# Patient Record
Sex: MALE | Race: WHITE | NOT HISPANIC OR LATINO | Employment: FULL TIME | URBAN - METROPOLITAN AREA
[De-identification: names, ages, dates, MRNs, and addresses within clinical notes are randomized per-mention and may not be internally consistent; named-entity substitution may affect disease eponyms.]

---

## 2017-01-31 ENCOUNTER — ALLSCRIPTS OFFICE VISIT (OUTPATIENT)
Dept: OTHER | Facility: OTHER | Age: 57
End: 2017-01-31

## 2017-04-07 DIAGNOSIS — I10 ESSENTIAL (PRIMARY) HYPERTENSION: ICD-10-CM

## 2017-04-07 DIAGNOSIS — R53.83 OTHER FATIGUE: ICD-10-CM

## 2017-04-07 DIAGNOSIS — E55.9 VITAMIN D DEFICIENCY: ICD-10-CM

## 2017-04-07 DIAGNOSIS — R73.9 HYPERGLYCEMIA: ICD-10-CM

## 2017-04-07 DIAGNOSIS — E78.5 HYPERLIPIDEMIA: ICD-10-CM

## 2017-04-07 DIAGNOSIS — Z12.5 ENCOUNTER FOR SCREENING FOR MALIGNANT NEOPLASM OF PROSTATE: ICD-10-CM

## 2017-08-10 ENCOUNTER — ALLSCRIPTS OFFICE VISIT (OUTPATIENT)
Dept: OTHER | Facility: OTHER | Age: 57
End: 2017-08-10

## 2017-08-14 ENCOUNTER — ALLSCRIPTS OFFICE VISIT (OUTPATIENT)
Dept: OTHER | Facility: OTHER | Age: 57
End: 2017-08-14

## 2017-10-20 ENCOUNTER — ALLSCRIPTS OFFICE VISIT (OUTPATIENT)
Dept: OTHER | Facility: OTHER | Age: 57
End: 2017-10-20

## 2018-01-10 NOTE — PROGRESS NOTES
Chief Complaint  Pt presented for influenza vaccine; administered without incident and tolerated well  ksd,cma      Active Problems    1  Acute bronchitis, unspecified organism (466 0) (J20 9)   2  Benign prostatic hypertrophy (600 00) (N40 0)   3  Bipolar Disorder (296 00)   4  Cerumen impaction (380 4) (H61 20)   5  Depression (311) (F32 9)   6  Encounter for PPD test (V74 1) (Z11 1)   7  Fatigue (780 79) (R53 83)   8  Hearing loss (389 9) (H91 90)   9  Hyperglycemia (790 29) (R73 9)   10  Hyperlipidemia (272 4) (E78 5)   11  Hypertension (401 9) (I10)   12  Need for pneumococcal vaccination (V03 82) (Z23)   13  Need for prophylactic vaccination and inoculation against influenza (V04 81) (Z23)   14  Obesity (278 00) (E66 9)   15  Obstructive sleep apnea (327 23) (G47 33)   16  Screening PSA (prostate specific antigen) (V76 44) (Z12 5)   17  Systolic ejection murmur (785 2) (R01 1)   18  Vitamin B12 deficiency (266 2) (E53 8)   19  Vitamin D deficiency (268 9) (E55 9)    Current Meds   1  AmLODIPine Besylate 10 MG Oral Tablet; Take 1 tablet daily; Therapy: 97VWH2359 to (Evaluate:01Ick2707)  Requested for: 02Sep2017; Last   Rx:10Aug2017 Ordered   2  Losartan Potassium-HCTZ 100-12 5 MG Oral Tablet; Take 1 tablet by mouth  daily; Therapy: 40UBQ8699 to (Hermes Saha)  Requested for: 02Sep2017; Last   Rx:10Aug2017 Ordered   3  Venlafaxine HCl  MG Oral Capsule Extended Release 24 Hour; TAKE 1   CAPSULE EVERY DAY; Therapy: 96ROV9421 to ([id number])  Requested for: 77MRZ5096; Last   Rx:11Oct2017 Ordered   4  Vitamin D3 3000 UNIT Oral Tablet; Therapy: (Recorded:31Jan2017) to Recorded    Allergies    1  Compazine TABS   2  Lisinopril TABS   3   SEROquel TABS    Plan  Flu vaccine need    · Fluzone Quadrivalent 0 5 ML Intramuscular Suspension Prefilled Syringe    Future Appointments    Date/Time Provider Specialty Site   77/18/8385 10:92 PM Roma San, [address] [place] [address] Signatures   Electronically signed by : Kushal Cortes DO; Oct 20 1914  4:11PM EST                       (Author)

## 2018-01-12 VITALS
DIASTOLIC BLOOD PRESSURE: 100 MMHG | HEIGHT: 68 IN | WEIGHT: 210 LBS | BODY MASS INDEX: 31.83 KG/M2 | SYSTOLIC BLOOD PRESSURE: 160 MMHG

## 2018-01-13 VITALS
SYSTOLIC BLOOD PRESSURE: 130 MMHG | WEIGHT: 214.13 LBS | HEIGHT: 68 IN | BODY MASS INDEX: 32.45 KG/M2 | DIASTOLIC BLOOD PRESSURE: 80 MMHG

## 2018-01-15 NOTE — PROGRESS NOTES
Chief Complaint  pt here for PPD read - negative [facility]      Active Problems    1  Acute bronchitis, unspecified organism (466 0) (J20 9)   2  Benign prostatic hypertrophy (600 00) (N40 0)   3  Bipolar Disorder (296 00)   4  Cerumen impaction (380 4) (H61 20)   5  Depression (311) (F32 9)   6  Encounter for PPD test (V74 1) (Z11 1)   7  Fatigue (780 79) (R53 83)   8  Hearing loss (389 9) (H91 90)   9  Hyperglycemia (790 29) (R73 9)   10  Hyperlipidemia (272 4) (E78 5)   11  Hypertension (401 9) (I10)   12  Need for pneumococcal vaccination (V03 82) (Z23)   13  Need for prophylactic vaccination and inoculation against influenza (V04 81) (Z23)   14  Obesity (278 00) (E66 9)   15  Obstructive sleep apnea (327 23) (G47 33)   16  Screening PSA (prostate specific antigen) (V76 44) (Z12 5)   17  Systolic ejection murmur (785 2) (R01 1)   18  Vitamin B12 deficiency (266 2) (E53 8)   19  Vitamin D deficiency (268 9) (E55 9)    Current Meds   1  AmLODIPine Besylate 10 MG Oral Tablet; Take 1 tablet daily; Therapy: 85IOR1971 to (Evaluate:40Oex6056)  Requested for: 42Pso4032; Last   Rx:10Aug2017; Status: ACTIVE - Retrospective By Protocol Authorization Ordered   2  Losartan Potassium-HCTZ 100-12 5 MG Oral Tablet; Take 1 tablet by mouth  daily; Therapy: 09CZK7155 to (Jessi Gonzalez)  Requested for: 10Aug2017; Last   Rx:10Aug2017; Status: ACTIVE - Retrospective By Protocol Authorization Ordered   3  Venlafaxine HCl  MG Oral Capsule Extended Release 24 Hour; TAKE 1   CAPSULE DAILY; Therapy: 18FRB1431 to (Evaluate:28Mqn3873)  Requested for: 87GSE0302; Last   Rx:01Dtl0586 Ordered   4  Vitamin D3 3000 UNIT Oral Tablet; Therapy: (Recorded:31Jan2017) to Recorded    Allergies    1  Compazine TABS   2  Lisinopril TABS   3  SEROquel TABS    Plan  Encounter for PPD test    · PPD  Hypertension    · AmLODIPine Besylate 10 MG Oral Tablet (Norvasc); Take 1 tablet daily   · Losartan Potassium-HCTZ 100-12 5 MG Oral Tablet;  Take 1 tablet by mouth   daily    Future Appointments    Date/Time Provider Specialty Site   01/25/3106 34:72 PM Chantel Ferrell DO Family [facility]     Signatures   Electronically signed by : Yoshi Rodriguez DO; Sep  2 1145  4:04PM EST                       (Author)

## 2018-03-23 DIAGNOSIS — F32.A DEPRESSION, UNSPECIFIED DEPRESSION TYPE: Primary | ICD-10-CM

## 2018-03-26 RX ORDER — VENLAFAXINE HYDROCHLORIDE 150 MG/1
CAPSULE, EXTENDED RELEASE ORAL
Qty: 90 CAPSULE | Refills: 0 | Status: SHIPPED | OUTPATIENT
Start: 2018-03-26 | End: 2018-06-25 | Stop reason: SDUPTHER

## 2018-04-18 ENCOUNTER — OFFICE VISIT (OUTPATIENT)
Dept: FAMILY MEDICINE CLINIC | Facility: CLINIC | Age: 58
End: 2018-04-18
Payer: COMMERCIAL

## 2018-04-18 VITALS
HEIGHT: 68 IN | DIASTOLIC BLOOD PRESSURE: 82 MMHG | SYSTOLIC BLOOD PRESSURE: 128 MMHG | WEIGHT: 210.2 LBS | BODY MASS INDEX: 31.86 KG/M2

## 2018-04-18 DIAGNOSIS — E55.9 VITAMIN D DEFICIENCY: ICD-10-CM

## 2018-04-18 DIAGNOSIS — I10 HYPERTENSION, UNSPECIFIED TYPE: Primary | ICD-10-CM

## 2018-04-18 DIAGNOSIS — R73.03 PREDIABETES: ICD-10-CM

## 2018-04-18 DIAGNOSIS — Z12.5 PROSTATE CANCER SCREENING: ICD-10-CM

## 2018-04-18 DIAGNOSIS — I10 ESSENTIAL HYPERTENSION: Primary | ICD-10-CM

## 2018-04-18 DIAGNOSIS — E78.2 MIXED HYPERLIPIDEMIA: ICD-10-CM

## 2018-04-18 PROBLEM — R01.1 SYSTOLIC EJECTION MURMUR: Status: ACTIVE | Noted: 2017-08-11

## 2018-04-18 PROCEDURE — 3008F BODY MASS INDEX DOCD: CPT | Performed by: FAMILY MEDICINE

## 2018-04-18 PROCEDURE — 99214 OFFICE O/P EST MOD 30 MIN: CPT | Performed by: FAMILY MEDICINE

## 2018-04-18 PROCEDURE — 3074F SYST BP LT 130 MM HG: CPT | Performed by: FAMILY MEDICINE

## 2018-04-18 PROCEDURE — 3079F DIAST BP 80-89 MM HG: CPT | Performed by: FAMILY MEDICINE

## 2018-04-18 NOTE — PROGRESS NOTES
Assessment/Plan:    Hypertension  Blood pressure stable-continue present medication recheck in 6 months  Hyperlipidemia  Labs ordered-await result  Today the patient is here with his wife  We did review his previous labs to given of the recall so that when is no results come in we can compare  Prediabetes  Labs ordered await results    Vitamin D deficiency  Labs ordered  Await result       Diagnoses and all orders for this visit:    Essential hypertension  -     Comprehensive metabolic panel; Future  -     TSH, 3rd generation; Future    Vitamin D deficiency  -     Vitamin D 25 hydroxy; Future    Prediabetes  -     HEMOGLOBIN A1C W/ EAG ESTIMATION; Future    Mixed hyperlipidemia  -     Lipid panel; Future    Prostate cancer screening  -     PSA, Total Screen; Future        There are no Patient Instructions on file for this visit  Subjective:        Patient ID: Yamila Vega is a 63 y o  male  Chief Complaint   Patient presents with    Follow-up     HTN and cholesterol; discuss bump upper abdomen       Patient here for 6 month check regarding blood pressure  Not have labs done prior to today's visit  Overall feels well        The following portions of the patient's history were reviewed and updated as appropriate: past medical history, past surgical history and problem list       Review of Systems   Constitutional: Negative for appetite change, fatigue, fever and unexpected weight change  HENT: Negative for congestion, ear pain, postnasal drip, rhinorrhea, sinus pain, sinus pressure and sore throat  Eyes: Negative for redness and visual disturbance  Respiratory: Negative for chest tightness and shortness of breath  Cardiovascular: Negative for chest pain, palpitations and leg swelling  Gastrointestinal: Negative for abdominal distention, abdominal pain, diarrhea and nausea  Endocrine: Negative for cold intolerance and heat intolerance  Genitourinary: Negative for dysuria and hematuria  Musculoskeletal: Negative for arthralgias, gait problem and myalgias  Skin: Negative for pallor and rash  Neurological: Negative for dizziness and headaches  Psychiatric/Behavioral: Negative for behavioral problems  The patient is not nervous/anxious  Objective:  /82 (BP Location: Left arm, Patient Position: Sitting, Cuff Size: Standard)   Ht 5' 8" (1 727 m)   Wt 95 3 kg (210 lb 3 2 oz)   BMI 31 96 kg/m²        Physical Exam   Constitutional: He is oriented to person, place, and time  He appears well-developed and well-nourished  No distress  Obese   HENT:   Head: Normocephalic and atraumatic  Right Ear: External ear normal    Left Ear: External ear normal    Mouth/Throat: Oropharynx is clear and moist    Eyes: Conjunctivae and EOM are normal  Pupils are equal, round, and reactive to light  No scleral icterus  Neck: Normal range of motion  Neck supple  No thyromegaly present  Cardiovascular: Normal rate, regular rhythm and intact distal pulses  No murmur heard  Pulmonary/Chest: Effort normal and breath sounds normal  He has no wheezes  Abdominal: Soft  Bowel sounds are normal  He exhibits no distension  There is no tenderness  Musculoskeletal: Normal range of motion  He exhibits no edema  Lymphadenopathy:     He has no cervical adenopathy  Neurological: He is alert and oriented to person, place, and time  He displays normal reflexes  Coordination normal    Skin: Skin is warm  No pallor  Psychiatric: He has a normal mood and affect   His behavior is normal  Thought content normal

## 2018-04-18 NOTE — ASSESSMENT & PLAN NOTE
Labs ordered-await result  Today the patient is here with his wife  We did review his previous labs to given of the recall so that when is no results come in we can compare

## 2018-04-19 RX ORDER — LOSARTAN POTASSIUM AND HYDROCHLOROTHIAZIDE 12.5; 1 MG/1; MG/1
1 TABLET ORAL DAILY
Qty: 90 TABLET | Refills: 3 | Status: SHIPPED | OUTPATIENT
Start: 2018-04-19 | End: 2019-05-23 | Stop reason: SDUPTHER

## 2018-04-19 RX ORDER — AMLODIPINE BESYLATE 10 MG/1
10 TABLET ORAL DAILY
Qty: 90 TABLET | Refills: 3 | Status: SHIPPED | OUTPATIENT
Start: 2018-04-19 | End: 2021-09-23 | Stop reason: HOSPADM

## 2018-06-25 DIAGNOSIS — F32.A DEPRESSION, UNSPECIFIED DEPRESSION TYPE: ICD-10-CM

## 2018-06-26 RX ORDER — VENLAFAXINE HYDROCHLORIDE 150 MG/1
150 CAPSULE, EXTENDED RELEASE ORAL DAILY
Qty: 90 CAPSULE | Refills: 1 | Status: SHIPPED | OUTPATIENT
Start: 2018-06-26 | End: 2018-12-19 | Stop reason: SDUPTHER

## 2018-10-24 ENCOUNTER — OFFICE VISIT (OUTPATIENT)
Dept: FAMILY MEDICINE CLINIC | Facility: CLINIC | Age: 58
End: 2018-10-24
Payer: COMMERCIAL

## 2018-10-24 VITALS
BODY MASS INDEX: 30.87 KG/M2 | SYSTOLIC BLOOD PRESSURE: 140 MMHG | WEIGHT: 208.4 LBS | DIASTOLIC BLOOD PRESSURE: 90 MMHG | HEIGHT: 69 IN

## 2018-10-24 DIAGNOSIS — R73.03 PREDIABETES: ICD-10-CM

## 2018-10-24 DIAGNOSIS — J01.90 ACUTE NON-RECURRENT SINUSITIS, UNSPECIFIED LOCATION: ICD-10-CM

## 2018-10-24 DIAGNOSIS — I10 ESSENTIAL HYPERTENSION: Primary | ICD-10-CM

## 2018-10-24 DIAGNOSIS — E78.5 HYPERLIPIDEMIA, UNSPECIFIED HYPERLIPIDEMIA TYPE: ICD-10-CM

## 2018-10-24 DIAGNOSIS — Z23 NEED FOR INFLUENZA VACCINATION: ICD-10-CM

## 2018-10-24 PROCEDURE — 99214 OFFICE O/P EST MOD 30 MIN: CPT | Performed by: FAMILY MEDICINE

## 2018-10-24 PROCEDURE — 90682 RIV4 VACC RECOMBINANT DNA IM: CPT

## 2018-10-24 PROCEDURE — 3008F BODY MASS INDEX DOCD: CPT | Performed by: FAMILY MEDICINE

## 2018-10-24 PROCEDURE — 1036F TOBACCO NON-USER: CPT | Performed by: FAMILY MEDICINE

## 2018-10-24 PROCEDURE — 90471 IMMUNIZATION ADMIN: CPT

## 2018-10-24 RX ORDER — AZITHROMYCIN 500 MG/1
500 TABLET, FILM COATED ORAL DAILY
Qty: 3 TABLET | Refills: 0 | Status: SHIPPED | OUTPATIENT
Start: 2018-10-24 | End: 2018-10-27

## 2018-10-24 NOTE — PROGRESS NOTES
Assessment/Plan:  Patient's blood pressure is up but he has been out of his blood pressure medicine for week  Recommend to continue taking his medication the right way and checking home blood pressures  Patient overdue for labs regarding his prediabetes and hyperlipidemia  Will get this done hopefully within the next week  Patient given prescription for Zithromax for 3 days for possible sinusitis  Flu shot given today  Recheck otherwise in 6 months for blood pressure check  Also refer to [address] [place] [address] Gastroenterology for baseline colonoscopy at the [address] [place] [address] and all orders for this visit:    Essential hypertension    Prediabetes    Hyperlipidemia, unspecified hyperlipidemia type    Acute non-recurrent sinusitis, unspecified location  -     azithromycin (ZITHROMAX) 500 MG tablet; Take 1 tablet (500 mg total) by mouth daily for 3 days    Need for influenza vaccination  -     influenza vaccine, [id number], quadrivalent, recombinant, PF, 0 5 mL, for patients 18 yr+ (FLUBLOK)        1  Essential hypertension     2  Prediabetes     3  Hyperlipidemia, unspecified hyperlipidemia type     4  Acute non-recurrent sinusitis, unspecified location  azithromycin (ZITHROMAX) 500 MG tablet   5  Need for influenza vaccination  influenza vaccine, [id number], quadrivalent, recombinant, PF, 0 5 mL, for patients 18 yr+ (FLUBLOK)       Subjective:        Patient ID: Meera Mary is a 63 y o  male  Chief Complaint   Patient presents with    Follow-up     6 months; discuss t-dap  pt states that a couple of day ago it felt like he had a sinus infection and after he was getting better the L side of the face started to get pain in the jaw    Flu Vaccine    Colonoscopy     discuss colonoscopy       Patient for 6 month check regarding blood pressures and labs  Unfortunately he once again forgot to do his blood work  Having sinus like issues especially with left-sided jaw pain    Symptoms have been present for about a week or so        The following portions of the patient's history were reviewed and updated as appropriate: past medical history, past surgical history and problem list       Review of Systems   Constitutional: Negative for appetite change, fatigue, fever and unexpected weight change  HENT: Positive for congestion, sinus pain and sinus pressure  Negative for ear pain, postnasal drip, rhinorrhea and sore throat  Eyes: Negative for redness and visual disturbance  Respiratory: Negative for chest tightness and shortness of breath  Cardiovascular: Negative for chest pain, palpitations and leg swelling  Gastrointestinal: Negative for abdominal distention, abdominal pain, diarrhea and nausea  Endocrine: Negative for cold intolerance and heat intolerance  Genitourinary: Negative for dysuria and hematuria  Musculoskeletal: Negative for arthralgias, gait problem and myalgias  Skin: Negative for pallor and rash  Neurological: Negative for dizziness and headaches  Psychiatric/Behavioral: Negative for behavioral problems  The patient is not nervous/anxious  Objective:  /90 (BP Location: Left arm, Patient Position: Sitting, Cuff Size: Standard)   Ht 5' 9" (1 753 m)   Wt 94 5 kg (208 lb 6 4 oz)   BMI 30 78 kg/m²        Physical Exam   Constitutional: He is oriented to person, place, and time  He appears well-nourished  No distress  HENT:   Head: Normocephalic and atraumatic  Right Ear: Tympanic membrane and external ear normal    Left Ear: Tympanic membrane and external ear normal    Nose: Nose normal    Mouth/Throat: Oropharynx is clear and moist    Eyes: Pupils are equal, round, and reactive to light  Conjunctivae and EOM are normal  No scleral icterus  Neck: Normal range of motion  Neck supple  No thyromegaly present  Cardiovascular: Normal rate, regular rhythm and intact distal pulses  No murmur heard    Pulmonary/Chest: Effort normal and breath sounds normal  He has no wheezes  Abdominal: Soft  He exhibits no distension  There is no tenderness  Musculoskeletal: Normal range of motion  He exhibits no edema  Lymphadenopathy:     He has no cervical adenopathy  Neurological: He is alert and oriented to person, place, and time  No cranial nerve deficit  Coordination normal    Skin: Skin is warm  No pallor  Psychiatric: He has a normal mood and affect  His behavior is normal  Thought content normal    Nursing note and vitals reviewed

## 2018-12-19 DIAGNOSIS — F32.A DEPRESSION, UNSPECIFIED DEPRESSION TYPE: ICD-10-CM

## 2018-12-20 RX ORDER — VENLAFAXINE HYDROCHLORIDE 150 MG/1
150 CAPSULE, EXTENDED RELEASE ORAL DAILY
Qty: 90 CAPSULE | Refills: 0 | Status: SHIPPED | OUTPATIENT
Start: 2018-12-20 | End: 2019-03-21 | Stop reason: SDUPTHER

## 2019-03-21 DIAGNOSIS — F32.A DEPRESSION, UNSPECIFIED DEPRESSION TYPE: ICD-10-CM

## 2019-03-21 RX ORDER — VENLAFAXINE HYDROCHLORIDE 150 MG/1
150 CAPSULE, EXTENDED RELEASE ORAL DAILY
Qty: 90 CAPSULE | Refills: 0 | Status: SHIPPED | OUTPATIENT
Start: 2019-03-21 | End: 2019-06-17 | Stop reason: SDUPTHER

## 2019-05-23 DIAGNOSIS — I10 HYPERTENSION, UNSPECIFIED TYPE: ICD-10-CM

## 2019-05-23 RX ORDER — LOSARTAN POTASSIUM AND HYDROCHLOROTHIAZIDE 12.5; 1 MG/1; MG/1
1 TABLET ORAL DAILY
Qty: 90 TABLET | Refills: 3 | Status: SHIPPED | OUTPATIENT
Start: 2019-05-23 | End: 2019-06-25

## 2019-06-17 DIAGNOSIS — F32.A DEPRESSION, UNSPECIFIED DEPRESSION TYPE: ICD-10-CM

## 2019-06-17 RX ORDER — VENLAFAXINE HYDROCHLORIDE 150 MG/1
CAPSULE, EXTENDED RELEASE ORAL
Qty: 90 CAPSULE | Refills: 0 | Status: SHIPPED | OUTPATIENT
Start: 2019-06-17 | End: 2019-09-12 | Stop reason: SDUPTHER

## 2019-06-17 RX ORDER — VENLAFAXINE HYDROCHLORIDE 150 MG/1
150 CAPSULE, EXTENDED RELEASE ORAL DAILY
Qty: 90 CAPSULE | Refills: 0 | OUTPATIENT
Start: 2019-06-17

## 2019-06-25 DIAGNOSIS — I10 ESSENTIAL HYPERTENSION: Primary | ICD-10-CM

## 2019-06-25 RX ORDER — HYDROCHLOROTHIAZIDE 12.5 MG/1
12.5 TABLET ORAL DAILY
Qty: 90 TABLET | Refills: 0 | Status: SHIPPED | OUTPATIENT
Start: 2019-06-25 | End: 2019-09-21 | Stop reason: SDUPTHER

## 2019-06-25 RX ORDER — LOSARTAN POTASSIUM 100 MG/1
100 TABLET ORAL DAILY
Qty: 90 TABLET | Refills: 0 | Status: SHIPPED | OUTPATIENT
Start: 2019-06-25 | End: 2019-11-09 | Stop reason: SDUPTHER

## 2019-09-12 DIAGNOSIS — F32.A DEPRESSION, UNSPECIFIED DEPRESSION TYPE: ICD-10-CM

## 2019-09-12 RX ORDER — VENLAFAXINE HYDROCHLORIDE 150 MG/1
CAPSULE, EXTENDED RELEASE ORAL
Qty: 90 CAPSULE | Refills: 0 | Status: SHIPPED | OUTPATIENT
Start: 2019-09-12 | End: 2019-12-09 | Stop reason: SDUPTHER

## 2019-09-21 DIAGNOSIS — I10 ESSENTIAL HYPERTENSION: ICD-10-CM

## 2019-09-24 ENCOUNTER — OFFICE VISIT (OUTPATIENT)
Dept: FAMILY MEDICINE CLINIC | Facility: CLINIC | Age: 59
End: 2019-09-24
Payer: COMMERCIAL

## 2019-09-24 VITALS
HEIGHT: 69 IN | SYSTOLIC BLOOD PRESSURE: 140 MMHG | OXYGEN SATURATION: 96 % | HEART RATE: 73 BPM | WEIGHT: 211.4 LBS | DIASTOLIC BLOOD PRESSURE: 80 MMHG | BODY MASS INDEX: 31.31 KG/M2 | TEMPERATURE: 98.1 F

## 2019-09-24 DIAGNOSIS — E55.9 VITAMIN D DEFICIENCY: ICD-10-CM

## 2019-09-24 DIAGNOSIS — R73.03 PREDIABETES: ICD-10-CM

## 2019-09-24 DIAGNOSIS — J01.00 ACUTE NON-RECURRENT MAXILLARY SINUSITIS: Primary | ICD-10-CM

## 2019-09-24 DIAGNOSIS — Z23 ENCOUNTER FOR IMMUNIZATION: ICD-10-CM

## 2019-09-24 DIAGNOSIS — E78.5 HYPERLIPIDEMIA, UNSPECIFIED HYPERLIPIDEMIA TYPE: ICD-10-CM

## 2019-09-24 DIAGNOSIS — Z12.5 PROSTATE CANCER SCREENING: ICD-10-CM

## 2019-09-24 DIAGNOSIS — I10 ESSENTIAL HYPERTENSION: ICD-10-CM

## 2019-09-24 PROCEDURE — 99213 OFFICE O/P EST LOW 20 MIN: CPT | Performed by: FAMILY MEDICINE

## 2019-09-24 PROCEDURE — 90471 IMMUNIZATION ADMIN: CPT | Performed by: FAMILY MEDICINE

## 2019-09-24 PROCEDURE — 3008F BODY MASS INDEX DOCD: CPT | Performed by: FAMILY MEDICINE

## 2019-09-24 PROCEDURE — 90682 RIV4 VACC RECOMBINANT DNA IM: CPT | Performed by: FAMILY MEDICINE

## 2019-09-24 RX ORDER — HYDROCHLOROTHIAZIDE 12.5 MG/1
TABLET ORAL
Qty: 90 TABLET | Refills: 0 | Status: SHIPPED | OUTPATIENT
Start: 2019-09-24 | End: 2019-12-09 | Stop reason: SDUPTHER

## 2019-09-24 RX ORDER — AZITHROMYCIN 250 MG/1
TABLET, FILM COATED ORAL
Qty: 6 TABLET | Refills: 0 | Status: SHIPPED | OUTPATIENT
Start: 2019-09-24 | End: 2019-09-29

## 2019-09-30 NOTE — PROGRESS NOTES
Assessment/Plan:    Treat his sinusitis with 5 day Z-Rakesh steam and ocean nasal spray  Blood pressure fair  Patient overdue for routine care and routine labs  Labs including CMP lipid and PSA with A1c to be done and patient will reschedule routine follow-up ASAP  Flu shot given today     Diagnoses and all orders for this visit:    Acute non-recurrent maxillary sinusitis  -     azithromycin (ZITHROMAX) 250 mg tablet; Take 2 tabs Day 1 then 1 tab daily Days 2 thru 5    Essential hypertension    Hyperlipidemia, unspecified hyperlipidemia type  -     Comprehensive metabolic panel; Future  -     Lipid panel; Future    Prostate cancer screening  -     PSA, Total Screen; Future    Prediabetes  -     Hemoglobin A1C; Future    Encounter for immunization  -     influenza vaccine, [id number], quadrivalent, recombinant, PF, 0 5 mL, for patients 18 yr+ (FLUBLOK)    Vitamin D deficiency  -     Vitamin D 25 hydroxy; Future        1  Acute non-recurrent maxillary sinusitis  azithromycin (ZITHROMAX) 250 mg tablet   2  Essential hypertension     3  Hyperlipidemia, unspecified hyperlipidemia type  Comprehensive metabolic panel    Lipid panel   4  Prostate cancer screening  PSA, Total Screen   5  Prediabetes  Hemoglobin A1C   6  Encounter for immunization  influenza vaccine, [id number], quadrivalent, recombinant, PF, 0 5 mL, for patients 18 yr+ (FLUBLOK)   7  Vitamin D deficiency  Vitamin D 25 hydroxy       Subjective:        Patient ID: Gilda Ledbetter is a 63 y o  male  Chief Complaint   Patient presents with    URI     congestion, cough; sx since Sat  keeping him up at night, coughing up green mucus, no fever, runny nose         Patient with sinus like symptoms over the last 7 days  Positive congestion and pressure  The following portions of the patient's history were reviewed and updated as appropriate: past medical history, past surgical history and problem list       Review of Systems   Constitutional: Negative for fever  HENT: Positive for congestion and sinus pressure  Respiratory: Positive for cough  Negative for wheezing  Cardiovascular: Negative for chest pain  Neurological: Negative for headaches  Objective:  /80 (BP Location: Left arm, Patient Position: Sitting, Cuff Size: Standard)   Pulse 73   Temp 98 1 °F (36 7 °C)   Ht 5' 9" (1 753 m)   Wt 95 9 kg (211 lb 6 4 oz)   SpO2 96%   BMI 31 22 kg/m²        Physical Exam   HENT:   Right Ear: Tympanic membrane and external ear normal    Left Ear: Tympanic membrane and external ear normal    Nose: Mucosal edema present  Pulmonary/Chest: Breath sounds normal    Musculoskeletal: He exhibits no edema  Lymphadenopathy:     He has no cervical adenopathy  Nursing note and vitals reviewed

## 2019-11-09 DIAGNOSIS — I10 ESSENTIAL HYPERTENSION: ICD-10-CM

## 2019-11-11 RX ORDER — LOSARTAN POTASSIUM 100 MG/1
TABLET ORAL
Qty: 90 TABLET | Refills: 0 | Status: SHIPPED | OUTPATIENT
Start: 2019-11-11 | End: 2019-12-09 | Stop reason: SDUPTHER

## 2019-12-09 ENCOUNTER — OFFICE VISIT (OUTPATIENT)
Dept: FAMILY MEDICINE CLINIC | Facility: CLINIC | Age: 59
End: 2019-12-09
Payer: COMMERCIAL

## 2019-12-09 VITALS
BODY MASS INDEX: 32.2 KG/M2 | SYSTOLIC BLOOD PRESSURE: 130 MMHG | WEIGHT: 217.4 LBS | TEMPERATURE: 97.5 F | HEART RATE: 74 BPM | OXYGEN SATURATION: 98 % | DIASTOLIC BLOOD PRESSURE: 80 MMHG | HEIGHT: 69 IN

## 2019-12-09 DIAGNOSIS — I10 ESSENTIAL HYPERTENSION: Primary | ICD-10-CM

## 2019-12-09 DIAGNOSIS — F32.A DEPRESSION, UNSPECIFIED DEPRESSION TYPE: ICD-10-CM

## 2019-12-09 DIAGNOSIS — I10 ESSENTIAL HYPERTENSION: ICD-10-CM

## 2019-12-09 DIAGNOSIS — R73.03 PREDIABETES: ICD-10-CM

## 2019-12-09 DIAGNOSIS — Z11.59 NEED FOR HEPATITIS C SCREENING TEST: ICD-10-CM

## 2019-12-09 PROCEDURE — 3008F BODY MASS INDEX DOCD: CPT | Performed by: FAMILY MEDICINE

## 2019-12-09 PROCEDURE — 99213 OFFICE O/P EST LOW 20 MIN: CPT | Performed by: FAMILY MEDICINE

## 2019-12-09 PROCEDURE — 1036F TOBACCO NON-USER: CPT | Performed by: FAMILY MEDICINE

## 2019-12-09 PROCEDURE — 3075F SYST BP GE 130 - 139MM HG: CPT | Performed by: FAMILY MEDICINE

## 2019-12-09 PROCEDURE — 3079F DIAST BP 80-89 MM HG: CPT | Performed by: FAMILY MEDICINE

## 2019-12-09 RX ORDER — VENLAFAXINE HYDROCHLORIDE 150 MG/1
CAPSULE, EXTENDED RELEASE ORAL
Qty: 90 CAPSULE | Refills: 0 | Status: SHIPPED | OUTPATIENT
Start: 2019-12-09 | End: 2019-12-09 | Stop reason: SDUPTHER

## 2019-12-09 NOTE — PATIENT INSTRUCTIONS
Obesity   AMBULATORY CARE:   Obesity  is when your body mass index (BMI) is greater than 30  Your healthcare provider will use your height and weight to measure your BMI  The risks of obesity include  many health problems, such as injuries or physical disability  You may need tests to check for the following:  · Diabetes     · High blood pressure or high cholesterol     · Heart disease     · Gallbladder or liver disease     · Cancer of the colon, breast, prostate, liver, or kidney     · Sleep apnea     · Arthritis or gout  Seek care immediately if:   · You have a severe headache, confusion, or difficulty speaking  · You have weakness on one side of your body  · You have chest pain, sweating, or shortness of breath  Contact your healthcare provider if:   · You have symptoms of gallbladder or liver disease, such as pain in your upper abdomen  · You have knee or hip pain and discomfort while walking  · You have symptoms of diabetes, such as intense hunger and thirst, and frequent urination  · You have symptoms of sleep apnea, such as snoring or daytime sleepiness  · You have questions or concerns about your condition or care  Treatment for obesity  focuses on helping you lose weight to improve your health  Even a small decrease in BMI can reduce the risk for many health problems  Your healthcare provider will help you set a weight-loss goal   · Lifestyle changes  are the first step in treating obesity  These include making healthy food choices and getting regular physical activity  Your healthcare provider may suggest a weight-loss program that involves coaching, education, and therapy  · Medicine  may help you lose weight when it is used with a healthy diet and physical activity  · Surgery  can help you lose weight if you are very obese and have other health problems  There are several types of weight-loss surgery  Ask your healthcare provider for more information    Be successful losing weight:   · Set small, realistic goals  An example of a small goal is to walk for 20 minutes 5 days a week  Anther goal is to lose 5% of your body weight  · Tell friends, family members, and coworkers about your goals  and ask for their support  Ask a friend to lose weight with you, or join a weight-loss support group  · Identify foods or triggers that may cause you to overeat , and find ways to avoid them  Remove tempting high-calorie foods from your home and workplace  Place a bowl of fresh fruit on your kitchen counter  If stress causes you to eat, then find other ways to cope with stress  · Keep a diary to track what you eat and drink  Also write down how many minutes of physical activity you do each day  Weigh yourself once a week and record it in your diary  Eating changes: You will need to eat 500 to 1,000 fewer calories each day than you currently eat to lose 1 to 2 pounds a week  The following changes will help you cut calories:  · Eat smaller portions  Use small plates, no larger than 9 inches in diameter  Fill your plate half full of fruits and vegetables  Measure your food using measuring cups until you know what a serving size looks like  · Eat 3 meals and 1 or 2 snacks each day  Plan your meals in advance  Manisha Abreu and eat at home most of the time  Eat slowly  · Eat fruits and vegetables at every meal   They are low in calories and high in fiber, which makes you feel full  Do not add butter, margarine, or cream sauce to vegetables  Use herbs to season steamed vegetables  · Eat less fat and fewer fried foods  Eat more baked or grilled chicken and fish  These protein sources are lower in calories and fat than red meat  Limit fast food  Dress your salads with olive oil and vinegar instead of bottled dressing  · Limit the amount of sugar you eat  Do not drink sugary beverages  Limit alcohol  Activity changes:  Physical activity is good for your body in many ways   It helps you burn calories and build strong muscles  It decreases stress and depression, and improves your mood  It can also help you sleep better  Talk to your healthcare provider before you begin an exercise program   · Exercise for at least 30 minutes 5 days a week  Start slowly  Set aside time each day for physical activity that you enjoy and that is convenient for you  It is best to do both weight training and an activity that increases your heart rate, such as walking, bicycling, or swimming  · Find ways to be more active  Do yard work and housecleaning  Walk up the stairs instead of using elevators  Spend your leisure time going to events that require walking, such as outdoor festivals or fairs  This extra physical activity can help you lose weight and keep it off  Follow up with your healthcare provider as directed: You may need to meet with a dietitian  Write down your questions so you remember to ask them during your visits  © 2017 [facility] Information is for End User's use only and may not be sold, redistributed or otherwise used for commercial purposes  All illustrations and images included in CareNotes® are the copyrighted property of [employer]  or [employer]  The above information is an  only  It is not intended as medical advice for individual conditions or treatments  Talk to your doctor, nurse or pharmacist before following any medical regimen to see if it is safe and effective for you

## 2019-12-09 NOTE — PROGRESS NOTES
Assessment/Plan:  Blood pressure stable  Patient overdue for getting lab work and colonoscopy  Insure kit given today  Recheck q 6 months  Diagnoses and all orders for this visit:    Essential hypertension    Depression, unspecified depression type    Prediabetes    Need for hepatitis C screening test  -     Hepatitis C antibody; Future        1  Essential hypertension     2  Depression, unspecified depression type     3  Prediabetes     4  Need for hepatitis C screening test  Hepatitis C antibody       Subjective:        Patient ID: Henry Corona is a 63 y o  male  Chief Complaint   Patient presents with    Follow-up     3 months;    Immunizations     discuss pneumo vaccine       Patient here for overdue visit  Has not done blood work previously ordered  Overall feels well      The following portions of the patient's history were reviewed and updated as appropriate: past medical history, past surgical history and problem list       Review of Systems   Constitutional: Negative for fatigue  Eyes: Negative for visual disturbance  Respiratory: Negative for cough and shortness of breath  Cardiovascular: Negative for chest pain, palpitations and leg swelling  Gastrointestinal: Negative for abdominal pain  Neurological: Negative for dizziness and headaches  Psychiatric/Behavioral: The patient is not nervous/anxious  Objective:  /80 (BP Location: Left arm, Patient Position: Sitting, Cuff Size: Standard)   Pulse 74   Temp 97 5 °F (36 4 °C)   Ht 5' 9" (1 753 m)   Wt 98 6 kg (217 lb 6 4 oz)   SpO2 98%   BMI 32 10 kg/m²        Physical Exam   Constitutional: He is oriented to person, place, and time  He appears well-nourished  No distress  HENT:   Head: Normocephalic  Eyes: Pupils are equal, round, and reactive to light  No scleral icterus  Cardiovascular:   Murmur heard  Pulmonary/Chest: Breath sounds normal    Musculoskeletal: He exhibits no edema     Lymphadenopathy:     He has no cervical adenopathy  Neurological: He is alert and oriented to person, place, and time  Psychiatric: He has a normal mood and affect  His behavior is normal  Thought content normal    Nursing note and vitals reviewed  BMI Counseling: Body mass index is 32 1 kg/m²  The BMI is above normal  Nutrition recommendations include reducing portion sizes, decreasing overall calorie intake, 3-5 servings of fruits/vegetables daily, reducing fast food intake, consuming healthier snacks, decreasing soda and/or juice intake and moderation in carbohydrate intake  Exercise recommendations include exercising 3-5 times per week

## 2019-12-10 RX ORDER — LOSARTAN POTASSIUM 100 MG/1
100 TABLET ORAL DAILY
Qty: 90 TABLET | Refills: 0 | Status: SHIPPED | OUTPATIENT
Start: 2019-12-10 | End: 2020-04-05

## 2019-12-10 RX ORDER — VENLAFAXINE HYDROCHLORIDE 150 MG/1
150 CAPSULE, EXTENDED RELEASE ORAL DAILY
Qty: 90 CAPSULE | Refills: 0 | Status: SHIPPED | OUTPATIENT
Start: 2019-12-10 | End: 2020-03-13

## 2019-12-10 RX ORDER — HYDROCHLOROTHIAZIDE 12.5 MG/1
12.5 TABLET ORAL DAILY
Qty: 90 TABLET | Refills: 0 | Status: SHIPPED | OUTPATIENT
Start: 2019-12-10 | End: 2020-11-13

## 2020-03-13 DIAGNOSIS — F32.A DEPRESSION, UNSPECIFIED DEPRESSION TYPE: ICD-10-CM

## 2020-03-13 RX ORDER — VENLAFAXINE HYDROCHLORIDE 150 MG/1
CAPSULE, EXTENDED RELEASE ORAL
Qty: 90 CAPSULE | Refills: 0 | Status: SHIPPED | OUTPATIENT
Start: 2020-03-13 | End: 2020-06-09 | Stop reason: SDUPTHER

## 2020-04-04 DIAGNOSIS — I10 ESSENTIAL HYPERTENSION: ICD-10-CM

## 2020-04-05 RX ORDER — LOSARTAN POTASSIUM 100 MG/1
TABLET ORAL
Qty: 90 TABLET | Refills: 0 | Status: SHIPPED | OUTPATIENT
Start: 2020-04-05 | End: 2020-11-13

## 2020-06-09 DIAGNOSIS — F32.A DEPRESSION, UNSPECIFIED DEPRESSION TYPE: ICD-10-CM

## 2020-06-09 RX ORDER — VENLAFAXINE HYDROCHLORIDE 150 MG/1
150 CAPSULE, EXTENDED RELEASE ORAL DAILY
Qty: 90 CAPSULE | Refills: 1 | Status: SHIPPED | OUTPATIENT
Start: 2020-06-09 | End: 2020-09-03

## 2020-09-02 DIAGNOSIS — F32.A DEPRESSION, UNSPECIFIED DEPRESSION TYPE: ICD-10-CM

## 2020-09-03 RX ORDER — VENLAFAXINE HYDROCHLORIDE 150 MG/1
CAPSULE, EXTENDED RELEASE ORAL
Qty: 90 CAPSULE | Refills: 1 | Status: SHIPPED | OUTPATIENT
Start: 2020-09-03 | End: 2021-02-20

## 2020-11-13 DIAGNOSIS — I10 ESSENTIAL HYPERTENSION: ICD-10-CM

## 2020-11-13 RX ORDER — LOSARTAN POTASSIUM 100 MG/1
TABLET ORAL
Qty: 90 TABLET | Refills: 0 | Status: SHIPPED | OUTPATIENT
Start: 2020-11-13 | End: 2021-02-09

## 2020-11-13 RX ORDER — HYDROCHLOROTHIAZIDE 12.5 MG/1
TABLET ORAL
Qty: 90 TABLET | Refills: 0 | Status: SHIPPED | OUTPATIENT
Start: 2020-11-13 | End: 2021-02-09

## 2020-12-08 ENCOUNTER — TELEPHONE (OUTPATIENT)
Dept: FAMILY MEDICINE CLINIC | Facility: CLINIC | Age: 60
End: 2020-12-08

## 2020-12-09 DIAGNOSIS — R43.2 LOSS OF TASTE: Primary | ICD-10-CM

## 2020-12-09 DIAGNOSIS — R43.0 LOSS OF SMELL: ICD-10-CM

## 2020-12-10 DIAGNOSIS — R43.2 LOSS OF TASTE: ICD-10-CM

## 2020-12-10 DIAGNOSIS — R43.0 LOSS OF SMELL: ICD-10-CM

## 2020-12-10 PROCEDURE — U0003 INFECTIOUS AGENT DETECTION BY NUCLEIC ACID (DNA OR RNA); SEVERE ACUTE RESPIRATORY SYNDROME CORONAVIRUS 2 (SARS-COV-2) (CORONAVIRUS DISEASE [COVID-19]), AMPLIFIED PROBE TECHNIQUE, MAKING USE OF HIGH THROUGHPUT TECHNOLOGIES AS DESCRIBED BY CMS-2020-01-R: HCPCS | Performed by: FAMILY MEDICINE

## 2020-12-11 LAB — SARS-COV-2 RNA SPEC QL NAA+PROBE: NOT DETECTED

## 2021-01-06 ENCOUNTER — OFFICE VISIT (OUTPATIENT)
Dept: FAMILY MEDICINE CLINIC | Facility: CLINIC | Age: 61
End: 2021-01-06
Payer: COMMERCIAL

## 2021-01-06 DIAGNOSIS — I10 ESSENTIAL HYPERTENSION: Primary | ICD-10-CM

## 2021-01-06 DIAGNOSIS — F32.A DEPRESSION, UNSPECIFIED DEPRESSION TYPE: ICD-10-CM

## 2021-01-06 DIAGNOSIS — E78.5 HYPERLIPIDEMIA, UNSPECIFIED HYPERLIPIDEMIA TYPE: ICD-10-CM

## 2021-01-06 DIAGNOSIS — Z12.5 PROSTATE CANCER SCREENING: ICD-10-CM

## 2021-01-06 DIAGNOSIS — Z23 ENCOUNTER FOR IMMUNIZATION: ICD-10-CM

## 2021-01-06 PROCEDURE — 99213 OFFICE O/P EST LOW 20 MIN: CPT | Performed by: FAMILY MEDICINE

## 2021-01-06 PROCEDURE — 90471 IMMUNIZATION ADMIN: CPT

## 2021-01-06 PROCEDURE — 90682 RIV4 VACC RECOMBINANT DNA IM: CPT

## 2021-01-17 VITALS
BODY MASS INDEX: 33.09 KG/M2 | HEIGHT: 69 IN | TEMPERATURE: 97.6 F | DIASTOLIC BLOOD PRESSURE: 86 MMHG | SYSTOLIC BLOOD PRESSURE: 142 MMHG | OXYGEN SATURATION: 98 % | HEART RATE: 88 BPM | WEIGHT: 223.4 LBS | RESPIRATION RATE: 18 BRPM

## 2021-01-17 NOTE — PROGRESS NOTES
Assessment/Plan:  Blood pressure fair  Not compliant 100% with taking his blood pressure medications  Recommend continue home monitoring  Recheck 6 months with labs at that time  Mood stable  Diagnoses and all orders for this visit:    Essential hypertension    Depression, unspecified depression type    Hyperlipidemia, unspecified hyperlipidemia type  -     Comprehensive metabolic panel; Future  -     Lipid panel; Future    Encounter for immunization  -     influenza vaccine, quadrivalent, recombinant, PF, 0 5 mL, for patients 18 yr+ (FLUBLOK)    Prostate cancer screening  -     PSA, Total Screen; Future        1  Essential hypertension     2  Depression, unspecified depression type     3  Hyperlipidemia, unspecified hyperlipidemia type  Comprehensive metabolic panel    Lipid panel   4  Encounter for immunization  influenza vaccine, quadrivalent, recombinant, PF, 0 5 mL, for patients 18 yr+ (FLUBL[facility])   5  Prostate cancer screening  PSA, Total Screen       Subjective:        Patient ID: Angie Humphries is a 62 y o  male  Chief Complaint   Patient presents with    Physical Exam    Hypertension       Blood pressure check  Feels well      The following portions of the patient's history were reviewed and updated as appropriate: past medical history, past surgical history and problem list       Review of Systems   Constitutional: Negative for fatigue  Eyes: Negative for visual disturbance  Respiratory: Negative for cough and shortness of breath  Cardiovascular: Negative for chest pain, palpitations and leg swelling  Gastrointestinal: Negative for abdominal pain  Neurological: Negative for dizziness and headaches  Psychiatric/Behavioral: The patient is not nervous/anxious            Objective:  /86   Pulse 88   Temp 97 6 °F (36 4 °C) (Temporal)   Resp 18   Ht 5' 9" (1 753 m)   Wt 101 kg (223 lb 6 4 oz)   SpO2 98%   BMI 32 99 kg/m²        Physical Exam  Vitals signs and nursing note reviewed  Constitutional:       General: He is not in acute distress  HENT:      Head: Normocephalic  Eyes:      General: No scleral icterus  Pupils: Pupils are equal, round, and reactive to light  Cardiovascular:      Heart sounds: Normal heart sounds  No murmur  Pulmonary:      Breath sounds: Normal breath sounds  Musculoskeletal:      Right lower leg: No edema  Left lower leg: No edema  Lymphadenopathy:      Cervical: No cervical adenopathy  Neurological:      General: No focal deficit present  Mental Status: He is alert and oriented to person, place, and time  Psychiatric:         Mood and Affect: Mood normal          Behavior: Behavior normal          Thought Content:  Thought content normal

## 2021-02-08 DIAGNOSIS — I10 ESSENTIAL HYPERTENSION: ICD-10-CM

## 2021-02-09 RX ORDER — LOSARTAN POTASSIUM 100 MG/1
TABLET ORAL
Qty: 90 TABLET | Refills: 0 | Status: SHIPPED | OUTPATIENT
Start: 2021-02-09 | End: 2021-04-06

## 2021-02-09 RX ORDER — HYDROCHLOROTHIAZIDE 12.5 MG/1
TABLET ORAL
Qty: 90 TABLET | Refills: 0 | Status: SHIPPED | OUTPATIENT
Start: 2021-02-09 | End: 2021-04-06

## 2021-02-20 DIAGNOSIS — F32.A DEPRESSION, UNSPECIFIED DEPRESSION TYPE: ICD-10-CM

## 2021-02-20 RX ORDER — VENLAFAXINE HYDROCHLORIDE 150 MG/1
CAPSULE, EXTENDED RELEASE ORAL
Qty: 90 CAPSULE | Refills: 1 | Status: SHIPPED | OUTPATIENT
Start: 2021-02-20 | End: 2021-08-09

## 2021-04-06 DIAGNOSIS — I10 ESSENTIAL HYPERTENSION: ICD-10-CM

## 2021-04-06 RX ORDER — LOSARTAN POTASSIUM 100 MG/1
TABLET ORAL
Qty: 90 TABLET | Refills: 0 | Status: SHIPPED | OUTPATIENT
Start: 2021-04-06 | End: 2021-07-29

## 2021-04-06 RX ORDER — HYDROCHLOROTHIAZIDE 12.5 MG/1
TABLET ORAL
Qty: 90 TABLET | Refills: 0 | Status: SHIPPED | OUTPATIENT
Start: 2021-04-06 | End: 2021-07-29

## 2021-05-27 ENCOUNTER — IMMUNIZATIONS (OUTPATIENT)
Dept: FAMILY MEDICINE CLINIC | Facility: HOSPITAL | Age: 61
End: 2021-05-27

## 2021-05-27 PROCEDURE — 91303: CPT

## 2021-05-27 PROCEDURE — 0031A: CPT

## 2021-07-29 DIAGNOSIS — I10 ESSENTIAL HYPERTENSION: ICD-10-CM

## 2021-07-29 RX ORDER — HYDROCHLOROTHIAZIDE 12.5 MG/1
TABLET ORAL
Qty: 90 TABLET | Refills: 0 | Status: SHIPPED | OUTPATIENT
Start: 2021-07-29 | End: 2021-10-26

## 2021-07-29 RX ORDER — LOSARTAN POTASSIUM 100 MG/1
TABLET ORAL
Qty: 90 TABLET | Refills: 0 | Status: SHIPPED | OUTPATIENT
Start: 2021-07-29 | End: 2021-10-26

## 2021-08-09 DIAGNOSIS — F32.A DEPRESSION, UNSPECIFIED DEPRESSION TYPE: ICD-10-CM

## 2021-08-09 RX ORDER — VENLAFAXINE HYDROCHLORIDE 150 MG/1
CAPSULE, EXTENDED RELEASE ORAL
Qty: 90 CAPSULE | Refills: 1 | Status: SHIPPED | OUTPATIENT
Start: 2021-08-09 | End: 2022-01-24

## 2021-09-22 ENCOUNTER — HOSPITAL ENCOUNTER (OUTPATIENT)
Facility: HOSPITAL | Age: 61
Setting detail: OBSERVATION
Discharge: HOME | End: 2021-09-23
Attending: EMERGENCY MEDICINE | Admitting: INTERNAL MEDICINE
Payer: COMMERCIAL

## 2021-09-22 ENCOUNTER — OFFICE VISIT (OUTPATIENT)
Dept: URGENT CARE | Facility: MEDICAL CENTER | Age: 61
End: 2021-09-22
Payer: COMMERCIAL

## 2021-09-22 ENCOUNTER — APPOINTMENT (EMERGENCY)
Dept: RADIOLOGY | Facility: HOSPITAL | Age: 61
End: 2021-09-22
Payer: COMMERCIAL

## 2021-09-22 VITALS
RESPIRATION RATE: 20 BRPM | WEIGHT: 220 LBS | SYSTOLIC BLOOD PRESSURE: 164 MMHG | HEIGHT: 69 IN | DIASTOLIC BLOOD PRESSURE: 92 MMHG | HEART RATE: 74 BPM | BODY MASS INDEX: 32.58 KG/M2 | TEMPERATURE: 97.8 F | OXYGEN SATURATION: 97 %

## 2021-09-22 DIAGNOSIS — R07.9 CHEST PAIN, UNSPECIFIED TYPE: Primary | ICD-10-CM

## 2021-09-22 DIAGNOSIS — R42 LIGHTHEADEDNESS: ICD-10-CM

## 2021-09-22 DIAGNOSIS — R53.83 FATIGUE: ICD-10-CM

## 2021-09-22 DIAGNOSIS — R07.9 CHEST PAIN: Primary | ICD-10-CM

## 2021-09-22 PROBLEM — D72.829 LEUKOCYTOSIS: Status: ACTIVE | Noted: 2021-09-22

## 2021-09-22 PROBLEM — E87.6 HYPOKALEMIA: Status: ACTIVE | Noted: 2021-09-22

## 2021-09-22 LAB
ALBUMIN SERPL BCP-MCNC: 4.1 G/DL (ref 3.5–5)
ALP SERPL-CCNC: 97 U/L (ref 46–116)
ALT SERPL W P-5'-P-CCNC: 49 U/L (ref 12–78)
ANION GAP SERPL CALCULATED.3IONS-SCNC: 9 MMOL/L (ref 4–13)
AST SERPL W P-5'-P-CCNC: 35 U/L (ref 5–45)
BASOPHILS # BLD AUTO: 0.1 THOUSANDS/ΜL (ref 0–0.1)
BASOPHILS NFR BLD AUTO: 1 % (ref 0–1)
BILIRUB SERPL-MCNC: 0.66 MG/DL (ref 0.2–1)
BUN SERPL-MCNC: 15 MG/DL (ref 5–25)
CALCIUM SERPL-MCNC: 8.3 MG/DL (ref 8.3–10.1)
CHLORIDE SERPL-SCNC: 103 MMOL/L (ref 100–108)
CO2 SERPL-SCNC: 27 MMOL/L (ref 21–32)
CREAT SERPL-MCNC: 0.94 MG/DL (ref 0.6–1.3)
EOSINOPHIL # BLD AUTO: 0.3 THOUSAND/ΜL (ref 0–0.61)
EOSINOPHIL NFR BLD AUTO: 3 % (ref 0–6)
ERYTHROCYTE [DISTWIDTH] IN BLOOD BY AUTOMATED COUNT: 13.6 % (ref 11.6–15.1)
GFR SERPL CREATININE-BSD FRML MDRD: 88 ML/MIN/1.73SQ M
GLUCOSE SERPL-MCNC: 101 MG/DL (ref 65–140)
HCT VFR BLD AUTO: 42.7 % (ref 36.5–49.3)
HGB BLD-MCNC: 14.9 G/DL (ref 12–17)
IMM GRANULOCYTES # BLD AUTO: 0.03 THOUSAND/UL (ref 0–0.2)
IMM GRANULOCYTES NFR BLD AUTO: 0 % (ref 0–2)
LYMPHOCYTES # BLD AUTO: 3.42 THOUSANDS/ΜL (ref 0.6–4.47)
LYMPHOCYTES NFR BLD AUTO: 33 % (ref 14–44)
MCH RBC QN AUTO: 30.6 PG (ref 26.8–34.3)
MCHC RBC AUTO-ENTMCNC: 34.9 G/DL (ref 31.4–37.4)
MCV RBC AUTO: 88 FL (ref 82–98)
MONOCYTES # BLD AUTO: 1.01 THOUSAND/ΜL (ref 0.17–1.22)
MONOCYTES NFR BLD AUTO: 10 % (ref 4–12)
NEUTROPHILS # BLD AUTO: 5.62 THOUSANDS/ΜL (ref 1.85–7.62)
NEUTS SEG NFR BLD AUTO: 53 % (ref 43–75)
NRBC BLD AUTO-RTO: 0 /100 WBCS
NT-PROBNP SERPL-MCNC: 67 PG/ML
PLATELET # BLD AUTO: 248 THOUSANDS/UL (ref 149–390)
PLATELET # BLD AUTO: 263 THOUSANDS/UL (ref 149–390)
POTASSIUM SERPL-SCNC: 3.4 MMOL/L (ref 3.5–5.3)
PROT SERPL-MCNC: 7.8 G/DL (ref 6.4–8.2)
RBC # BLD AUTO: 4.87 MILLION/UL (ref 3.88–5.62)
SODIUM SERPL-SCNC: 139 MMOL/L (ref 136–145)
TROPONIN I SERPL-MCNC: <0.02 NG/ML
WBC # BLD AUTO: 10.48 THOUSAND/UL (ref 4.31–10.16)

## 2021-09-22 PROCEDURE — 93005 ELECTROCARDIOGRAM TRACING: CPT

## 2021-09-22 PROCEDURE — 36415 COLL VENOUS BLD VENIPUNCTURE: CPT

## 2021-09-22 PROCEDURE — 85049 AUTOMATED PLATELET COUNT: CPT | Performed by: INTERNAL MEDICINE

## 2021-09-22 PROCEDURE — 99285 EMERGENCY DEPT VISIT HI MDM: CPT

## 2021-09-22 PROCEDURE — 83880 ASSAY OF NATRIURETIC PEPTIDE: CPT

## 2021-09-22 PROCEDURE — 99283 EMERGENCY DEPT VISIT LOW MDM: CPT | Performed by: PHYSICIAN ASSISTANT

## 2021-09-22 PROCEDURE — 84484 ASSAY OF TROPONIN QUANT: CPT | Performed by: INTERNAL MEDICINE

## 2021-09-22 PROCEDURE — 84484 ASSAY OF TROPONIN QUANT: CPT | Performed by: EMERGENCY MEDICINE

## 2021-09-22 PROCEDURE — G0382 LEV 3 HOSP TYPE B ED VISIT: HCPCS | Performed by: PHYSICIAN ASSISTANT

## 2021-09-22 PROCEDURE — 93005 ELECTROCARDIOGRAM TRACING: CPT | Performed by: PHYSICIAN ASSISTANT

## 2021-09-22 PROCEDURE — 80053 COMPREHEN METABOLIC PANEL: CPT | Performed by: EMERGENCY MEDICINE

## 2021-09-22 PROCEDURE — 71045 X-RAY EXAM CHEST 1 VIEW: CPT

## 2021-09-22 PROCEDURE — 85025 COMPLETE CBC W/AUTO DIFF WBC: CPT | Performed by: EMERGENCY MEDICINE

## 2021-09-22 PROCEDURE — 99220 PR INITIAL OBSERVATION CARE/DAY 70 MINUTES: CPT | Performed by: INTERNAL MEDICINE

## 2021-09-22 PROCEDURE — 99285 EMERGENCY DEPT VISIT HI MDM: CPT | Performed by: EMERGENCY MEDICINE

## 2021-09-22 RX ORDER — ASPIRIN 325 MG
325 TABLET ORAL ONCE
Status: COMPLETED | OUTPATIENT
Start: 2021-09-22 | End: 2021-09-22

## 2021-09-22 RX ORDER — NITROGLYCERIN 0.4 MG/1
0.4 TABLET SUBLINGUAL ONCE
Status: COMPLETED | OUTPATIENT
Start: 2021-09-22 | End: 2021-09-22

## 2021-09-22 RX ORDER — HYDROCHLOROTHIAZIDE 12.5 MG/1
12.5 TABLET ORAL DAILY
Status: DISCONTINUED | OUTPATIENT
Start: 2021-09-23 | End: 2021-09-23 | Stop reason: HOSPADM

## 2021-09-22 RX ORDER — LOSARTAN POTASSIUM 50 MG/1
100 TABLET ORAL DAILY
Status: DISCONTINUED | OUTPATIENT
Start: 2021-09-23 | End: 2021-09-23 | Stop reason: HOSPADM

## 2021-09-22 RX ORDER — VENLAFAXINE HYDROCHLORIDE 150 MG/1
150 CAPSULE, EXTENDED RELEASE ORAL DAILY
Status: DISCONTINUED | OUTPATIENT
Start: 2021-09-23 | End: 2021-09-23 | Stop reason: HOSPADM

## 2021-09-22 RX ORDER — HEPARIN SODIUM 5000 [USP'U]/ML
5000 INJECTION, SOLUTION INTRAVENOUS; SUBCUTANEOUS EVERY 8 HOURS SCHEDULED
Status: DISCONTINUED | OUTPATIENT
Start: 2021-09-22 | End: 2021-09-23 | Stop reason: HOSPADM

## 2021-09-22 RX ADMIN — HEPARIN SODIUM 5000 UNITS: 5000 INJECTION INTRAVENOUS; SUBCUTANEOUS at 21:08

## 2021-09-22 RX ADMIN — NITROGLYCERIN 0.4 MG: 0.4 TABLET SUBLINGUAL at 13:31

## 2021-09-22 RX ADMIN — ASPIRIN 325 MG ORAL TABLET 325 MG: 325 PILL ORAL at 14:28

## 2021-09-22 NOTE — ED ATTENDING ATTESTATION
9/22/2021  IDidier MD, saw and evaluated the patient  I have discussed the patient with the resident/non-physician practitioner and agree with the resident's/non-physician practitioner's findings, Plan of Care, and MDM as documented in the resident's/non-physician practitioner's note, except where noted  All available labs and Radiology studies were reviewed  I was present for key portions of any procedure(s) performed by the resident/non-physician practitioner and I was immediately available to provide assistance  At this point I agree with the current assessment done in the Emergency Department  I have conducted an independent evaluation of this patient a history and physical is as follows:    44-year-old male presenting to emergency department chest pressure  Occurred while driving the car  Has been having intermittent dyspnea with exertion worsening recently  Also will get similar tightness in the chest with exertion recently  No history of MI  Patient has hyperlipidemia, hypertension, obese  No recent stress test or catheterization  Regular rhythm  Lungs are clear  Bilateral edema noted  Agree with cardiac evaluation    Will need admission to hospital   Nitro for the elevated blood pressure and chest pressure    ED Course         Critical Care Time  Procedures

## 2021-09-22 NOTE — ED PROVIDER NOTES
History  Chief Complaint   Patient presents with    Chest Pain     Pt reports chest heaviness starting today, has felt increasingly more fatigued over the last few days +dizziness Denies SOB or nausea      Patient is a 81-year-old male with a past medical history of hypertension presents to the emergency department with a complaint of chest heaviness  Patient reports that he has been feeling fatigued for the past several weeks that he believes was due to stress from his job ended at the discharge  Yesterday he began to notice small episodes of chest heaviness that were not severe and self-resolved  This morning while he was driving his car he began to experience severe chest pain that he described as heaviness, located in the left side of his chest that was nonradiating and he rated it an 8/10  It concerned him enough to drive to urgent care where they conducted an EKG and then wanted to transport him to our emergency department via EMS  He refused EMS and had his wife drive him to our emergency department where he now says that his chest pain has began to resolve  The patient also reports lightheadedness but denies shortness of breath or nausea  He also reports that he has begun to notice swelling in his lower extremities bilaterally over the past few weeks  Prior to Admission Medications   Prescriptions Last Dose Informant Patient Reported? Taking?    Cholecalciferol (VITAMIN D3) 3000 units TABS   Yes No   Sig: Take by mouth   amLODIPine (NORVASC) 10 mg tablet   No No   Sig: Take 1 tablet (10 mg total) by mouth daily   hydrochlorothiazide (HYDRODIURIL) 12 5 mg tablet   No No   Sig: TAKE ONE TABLET BY MOUTH DAILY   losartan (COZAAR) 100 MG tablet   No No   Sig: TAKE ONE TABLET BY MOUTH DAILY   venlafaxine (EFFEXOR-XR) 150 mg 24 hr capsule   No No   Sig: TAKE ONE CAPSULE BY MOUTH DAILY      Facility-Administered Medications: None       Past Medical History:   Diagnosis Date    Alcohol abuse Uncomplicated    Colon cancer [facility])     Last Assessed: 2/13/2013    Hearing loss     Hereditary spherocytosis ([address] )     Thinking about Suicide [facility])        Past Surgical History:   Procedure Laterality Date    CHOLECYSTECTOMY  1978    SPLENECTOMY  1978    due to spherocytosis       Family History   Problem Relation Age of Onset    Colon cancer Mother     Hypertension Mother         Essential    Cancer Father         Gastric     I have reviewed and agree with the history as documented  E-Cigarette/Vaping    E-Cigarette Use Never User      E-Cigarette/Vaping Substances    Nicotine No     THC No     CBD No     Flavoring No      Social History     Tobacco Use    Smoking status: Former Smoker    Smokeless tobacco: Never Used   Vaping Use    Vaping Use: Never used   Substance Use Topics    Alcohol use: No     Comment: Recovering Alcoholic    Drug use: Never        Review of Systems   Constitutional: Positive for fatigue  Negative for chills and fever  HENT: Negative for congestion, ear pain, rhinorrhea and sore throat  Respiratory: Negative for cough, shortness of breath and wheezing  Cardiovascular: Positive for chest pain and leg swelling  Negative for palpitations  Gastrointestinal: Negative for abdominal pain, diarrhea, nausea and vomiting  Genitourinary: Negative for dysuria and hematuria  Musculoskeletal: Negative for arthralgias and back pain  Skin: Negative for color change and rash  Neurological: Positive for light-headedness  Negative for seizures, syncope, numbness and headaches  All other systems reviewed and are negative        Physical Exam  ED Triage Vitals   Temperature Pulse Respirations Blood Pressure SpO2   09/22/21 1305 09/22/21 1240 09/22/21 1240 09/22/21 1240 09/22/21 1240   98 5 °F (36 9 °C) 74 18 (!) 213/100 99 %      Temp Source Heart Rate Source Patient Position - Orthostatic VS BP Location FiO2 (%)   09/22/21 1305 09/22/21 1240 09/22/21 1240 09/22/21 1240 --   Oral Monitor Sitting Right arm       Pain Score       09/22/21 1240       2             Orthostatic Vital Signs  Vitals:    09/22/21 1240 09/22/21 1300 09/22/21 1330 09/22/21 1400   BP: (!) 213/100 (!) 213/115 (!) 192/106 155/90   Pulse: 74 80 70 66   Patient Position - Orthostatic VS: Sitting Sitting Lying Lying       Physical Exam  Vitals and nursing note reviewed  Constitutional:       General: He is not in acute distress  Appearance: He is well-developed  He is obese  He is not diaphoretic  HENT:      Head: Normocephalic and atraumatic  Eyes:      Extraocular Movements: Extraocular movements intact  Conjunctiva/sclera: Conjunctivae normal       Pupils: Pupils are equal, round, and reactive to light  Neck:      Thyroid: No thyromegaly  Vascular: No JVD  Trachea: No tracheal deviation  Cardiovascular:      Rate and Rhythm: Normal rate and regular rhythm  Pulses:           Carotid pulses are 2+ on the right side and 2+ on the left side  Radial pulses are 2+ on the right side and 2+ on the left side  Posterior tibial pulses are 2+ on the right side and 2+ on the left side  Heart sounds: Heart sounds not distant  Murmur heard  Pulmonary:      Effort: Pulmonary effort is normal  No tachypnea or respiratory distress  Breath sounds: Normal breath sounds  No stridor  No decreased breath sounds, wheezing, rhonchi or rales  Chest:      Chest wall: No tenderness, crepitus or edema  Abdominal:      General: Bowel sounds are normal       Palpations: Abdomen is soft  Tenderness: There is no abdominal tenderness  Musculoskeletal:         General: Normal range of motion  Cervical back: Normal range of motion and neck supple  Right lower leg: No tenderness  Edema present  Left lower leg: No tenderness  Edema present  Skin:     General: Skin is warm and dry  Capillary Refill: Capillary refill takes 2 to 3 seconds        Findings: No erythema or rash  Neurological:      General: No focal deficit present  Mental Status: He is alert and oriented to person, place, and time  Cranial Nerves: No cranial nerve deficit  Motor: No weakness     Psychiatric:         Mood and Affect: Mood normal          Behavior: Behavior normal          ED Medications  Medications   aspirin tablet 325 mg (has no administration in time range)   nitroglycerin (NITROSTAT) SL tablet 0 4 mg (0 4 mg Sublingual Given 9/22/21 1331)       Diagnostic Studies  Results Reviewed     Procedure Component Value Units Date/Time    NT-BNP PRO [[id number]]  (Normal) Collected: 09/22/21 1334    Lab Status: Final result Specimen: Blood from Arm, Right Updated: 09/22/21 1411     NT-proBNP 67 pg/mL     Troponin I [[id number]]  (Normal) Collected: 09/22/21 1247    Lab Status: Final result Specimen: Blood from Arm, Right Updated: 09/22/21 1327     Troponin I <0 02 ng/mL     Comprehensive metabolic panel [[id number]]  (Abnormal) Collected: 09/22/21 1247    Lab Status: Final result Specimen: Blood from Arm, Right Updated: 09/22/21 1323     Sodium 139 mmol/L      Potassium 3 4 mmol/L      Chloride 103 mmol/L      CO2 27 mmol/L      ANION GAP 9 mmol/L      BUN 15 mg/dL      Creatinine 0 94 mg/dL      Glucose 101 mg/dL      Calcium 8 3 mg/dL      AST 35 U/L      ALT 49 U/L      Alkaline Phosphatase 97 U/L      Total Protein 7 8 g/dL      Albumin 4 1 g/dL      Total Bilirubin 0 66 mg/dL      eGFR 88 ml/min/1 73sq m     Narrative:      Alan guidelines for Chronic Kidney Disease (CKD):     Stage 1 with normal or high GFR (GFR > 90 mL/min/1 73 square meters)    Stage 2 Mild CKD (GFR = 60-89 mL/min/1 73 square meters)    Stage 3A Moderate CKD (GFR = 45-59 mL/min/1 73 square meters)    Stage 3B Moderate CKD (GFR = 30-44 mL/min/1 73 square meters)    Stage 4 Severe CKD (GFR = 15-29 mL/min/1 73 square meters)    Stage 5 End Stage CKD (GFR <15 mL/min/1 73 square meters)  Note: GFR calculation is accurate only with a steady state creatinine    CBC and differential [[id number]]  (Abnormal) Collected: 09/22/21 1247    Lab Status: Final result Specimen: Blood from Arm, Right Updated: 09/22/21 1304     WBC 10 48 Thousand/uL      RBC 4 87 Million/uL      Hemoglobin 14 9 g/dL      Hematocrit 42 7 %      MCV 88 fL      MCH 30 6 pg      MCHC 34 9 g/dL      RDW 13 6 %      Platelets 638 Thousands/uL      nRBC 0 /100 WBCs      Neutrophils Relative 53 %      Immat GRANS % 0 %      Lymphocytes Relative 33 %      Monocytes Relative 10 %      Eosinophils Relative 3 %      Basophils Relative 1 %      Neutrophils Absolute 5 62 Thousands/µL      Immature Grans Absolute 0 03 Thousand/uL      Lymphocytes Absolute 3 42 Thousands/µL      Monocytes Absolute 1 01 Thousand/µL      Eosinophils Absolute 0 30 Thousand/µL      Basophils Absolute 0 10 Thousands/µL                  XR chest 1 view portable   ED Interpretation by Blanca Mancilla DO (09/22 8375)   After discussion with attending physician it appears the blunting of the left costophrenic angle is body habitus  Final Result by Chana Molina MD (09/22 5884)      No acute cardiopulmonary disease                    Workstation performed: [id number]               Procedures  ECG 12 Lead Documentation Only    Date/Time: 9/22/2021 12:47 PM  Performed by: Blanca Mancilla DO  Authorized by: Blanca Mancilla DO     Indications / Diagnosis:  Chest pain  ECG reviewed by me, the ED Provider: yes    Patient location:  ED  Previous ECG:     Previous ECG:  Compared to current    Comparison ECG info:  Dynamic changes of T waves in 1, aVL,V3, V4,V5 and V6    Similarity:  Changes noted    Comparison to cardiac monitor: Yes    Interpretation:     Interpretation: abnormal    Rate:     ECG rate:  76    ECG rate assessment: normal    Rhythm:     Rhythm: sinus rhythm    QRS:     QRS axis:  Right    QRS intervals:  Normal  Conduction: Conduction: normal    ST segments:     ST segments:  Normal  T waves:     T waves: flattening and inverted      Flattening:  III, aVF and V4    Inverted:  I, aVL, V1, V5, V6, II and aVR  Comments:      Dynamic T wave changes when compared to ECG from earlier today  Concerning for ischemia  ECG 12 Lead Documentation Only    Date/Time: 9/22/2021 1:26 PM  Performed by: Mansoor Torres DO  Authorized by: Mansoor Torres DO     Indications / Diagnosis:  Chest pain  ECG reviewed by me, the ED Provider: yes    Patient location:  ED  Previous ECG:     Previous ECG:  Compared to current    Similarity:  No change    Comparison to cardiac monitor: Yes    Interpretation:     Interpretation: abnormal    Rate:     ECG rate:  72    ECG rate assessment: normal    Rhythm:     Rhythm: sinus rhythm    QRS:     QRS axis:  Right    QRS intervals:  Normal  Conduction:     Conduction: normal    ST segments:     ST segments:  Normal  T waves:     T waves: flattening and inverted      Flattening:  V4, III and aVF    Inverted:  I, aVL, V1, aVR, V5, V6 and II  Comments:      Abnormal ECG unchanged from previous  ED Course             HEART Risk Score      Most Recent Value   Heart Score Risk Calculator   History  2 Filed at: 09/22/2021 1358   ECG  1 Filed at: 09/22/2021 1358   Age  1 Filed at: 09/22/2021 1358   Risk Factors  1 Filed at: 09/22/2021 1358   Troponin  0 Filed at: 09/22/2021 1358   HEART Score  5 Filed at: 09/22/2021 1358                      SBIRT 22yo+      Most Recent Value   SBIRT (23 yo +)   In order to provide better care to our patients, we are screening all of our patients for alcohol and drug use  Would it be okay to ask you these screening questions? Yes Filed at: 09/22/2021 1244   Initial Alcohol Screen: US AUDIT-C    1  How often do you have a drink containing alcohol?  0 Filed at: 09/22/2021 1244   2  How many drinks containing alcohol do you have on a typical day you are drinking?    0 Filed at: 09/22/2021 1244   3a  Male UNDER 65: How often do you have five or more drinks on one occasion? 0 Filed at: 09/22/2021 1244   Audit-C Score  0 Filed at: 09/22/2021 1244   JOHANNA: How many times in the past year have you    Used an illegal drug or used a prescription medication for non-medical reasons? Never Filed at: 09/22/2021 1244          Emmanuel' Criteria for PE      Most Recent Value   Wells' Criteria for PE   Clinical signs and symptoms of DVT  0 Filed at: 09/22/2021 1357   PE is primary diagnosis or equally likely  0 Filed at: 09/22/2021 1357   HR >100  0 Filed at: 09/22/2021 1357   Immobilization at least 3 days or Surgery in the previous 4 weeks  0 Filed at: 09/22/2021 1357   Previous, objectively diagnosed PE or DVT  0 Filed at: 09/22/2021 1357   Hemoptysis  0 Filed at: 09/22/2021 1357   Malignancy with treatment within 6 months or palliative  0 Filed at: 09/22/2021 1357   Emmanuel' Criteria Total  0 Filed at: 09/22/2021 1357            MDM  Number of Diagnoses or Management Options  Chest pain  Fatigue  Lightheadedness  Diagnosis management comments: Patient is a 29-year-old male with a past medical history of hypertension presents to the emergency department with a complaint of chest heaviness  Upon initial presentation the patient was A&O x4, lying in his ER bed and in no acute distress  Upon cardiac exam is a soft S2 heart sounds but regularly regular rate and rhythm with no appreciable rubs  His lungs were clear to auscultation bilaterally with normal effort no wheezing  The patient had +1 pitting edema bilaterally in his ankles and the remainder of his physical exam was unremarkable  The ECG performed at urgent care earlier this morning indicated inverted T-waves most prominent in V3, V4, V5 and V6  Our ECG conducted in the emergency department show dynamic changes to his T-waves that are concerning for ischemia    I have ordered a full ACS workup at this time as well as a BNP for my concerns of possible CHF  Patient is a Wells score of 0 and I have very low clinical suspicion for PE at this time  Patient has a heart score of 5 and will need to be admitted for further workup  14:15  I discussed the case with Dr Levon Hilario who agreed to admit the patient to obs/tele under her care  Disposition  Final diagnoses:   Chest pain   Lightheadedness   Fatigue     Time reflects when diagnosis was documented in both MDM as applicable and the Disposition within this note     Time User Action Codes Description Comment    9/22/2021  2:13 PM Adina Desi [R07 9] Chest pain     9/22/2021  2:14 PM Mathieu Marquez Add [R42] Lightheadedness     9/22/2021  2:14 PM Mathieu Marquez Add [R53 83] Fatigue       ED Disposition     ED Disposition Condition Date/Time Comment    Admit Stable Wed Sep 22, 2021  2:13 PM Case was discussed with Dr Levon Hilario and the patient's admission status was agreed to be observation telemetry to the service of Dr Levon Hilario   Follow-up Information    None         Patient's Medications   Discharge Prescriptions    No medications on file     No discharge procedures on file  PDMP Review     None           ED Provider  Attending physically available and evaluated Opal Mckinley  I managed the patient along with the ED Attending      Electronically Signed by         Agus Perez DO  09/22/21 5574

## 2021-09-22 NOTE — PLAN OF CARE
Problem: PAIN - ADULT  Goal: Verbalizes/displays adequate comfort level or baseline comfort level  Description: Interventions:  - Encourage patient to monitor pain and request assistance  - Assess pain using appropriate pain scale  - Administer analgesics based on type and severity of pain and evaluate response  - Implement non-pharmacological measures as appropriate and evaluate response  - Consider cultural and social influences on pain and pain management  - Notify physician/advanced practitioner if interventions unsuccessful or patient reports new pain  Outcome: Progressing     Problem: INFECTION - ADULT  Goal: Absence or prevention of progression during hospitalization  Description: INTERVENTIONS:  - Assess and monitor for signs and symptoms of infection  - Monitor lab/diagnostic results  - Monitor all insertion sites, i e  indwelling lines, tubes, and drains  - Monitor endotracheal if appropriate and nasal secretions for changes in amount and color  - Gobler appropriate cooling/warming therapies per order  - Administer medications as ordered  - Instruct and encourage patient and family to use good hand hygiene technique  - Identify and instruct in appropriate isolation precautions for identified infection/condition  Outcome: Progressing  Goal: Absence of fever/infection during neutropenic period  Description: INTERVENTIONS:  - Monitor WBC    Outcome: Progressing     Problem: SAFETY ADULT  Goal: Patient will remain free of falls  Description: INTERVENTIONS:  - Educate patient/family on patient safety including physical limitations  - Instruct patient to call for assistance with activity   - Consult OT/PT to assist with strengthening/mobility   - Keep Call bell within reach  - Keep bed low and locked with side rails adjusted as appropriate  - Keep care items and personal belongings within reach  - Initiate and maintain comfort rounds  - Make Fall Risk Sign visible to staff  - Offer Toileting every  Hours, in advance of need  - Initiate/Maintain alarm  - Obtain necessary fall risk management equipment:   - Apply yellow socks and bracelet for high fall risk patients  - Consider moving patient to room near nurses station  Outcome: Progressing  Goal: Maintain or return to baseline ADL function  Description: INTERVENTIONS:  -  Assess patient's ability to carry out ADLs; assess patient's baseline for ADL function and identify physical deficits which impact ability to perform ADLs (bathing, care of mouth/teeth, toileting, grooming, dressing, etc )  - Assess/evaluate cause of self-care deficits   - Assess range of motion  - Assess patient's mobility; develop plan if impaired  - Assess patient's need for assistive devices and provide as appropriate  - Encourage maximum independence but intervene and supervise when necessary  - Involve family in performance of ADLs  - Assess for home care needs following discharge   - Consider OT consult to assist with ADL evaluation and planning for discharge  - Provide patient education as appropriate  Outcome: Progressing  Goal: Maintains/Returns to pre admission functional level  Description: INTERVENTIONS:  - Perform BMAT or MOVE assessment daily    - Set and communicate daily mobility goal to care team and patient/family/caregiver  - Collaborate with rehabilitation services on mobility goals if consulted  - Perform Range of Motion  times a day  - Reposition patient every  hours    - Dangle patient  times a day  - Stand patient  times a day  - Ambulate patient  times a day  - Out of bed to chair  times a day   - Out of bed for meals  times a day  - Out of bed for toileting  - Record patient progress and toleration of activity level   Outcome: Progressing     Problem: DISCHARGE PLANNING  Goal: Discharge to home or other facility with appropriate resources  Description: INTERVENTIONS:  - Identify barriers to discharge w/patient and caregiver  - Arrange for needed discharge resources and transportation as appropriate  - Identify discharge learning needs (meds, wound care, etc )  - Arrange for interpretive services to assist at discharge as needed  - Refer to Case Management Department for coordinating discharge planning if the patient needs post-hospital services based on physician/advanced practitioner order or complex needs related to functional status, cognitive ability, or social support system  Outcome: Progressing     Problem: Knowledge Deficit  Goal: Patient/family/caregiver demonstrates understanding of disease process, treatment plan, medications, and discharge instructions  Description: Complete learning assessment and assess knowledge base    Interventions:  - Provide teaching at level of understanding  - Provide teaching via preferred learning methods  Outcome: Progressing     Problem: CARDIOVASCULAR - ADULT  Goal: Maintains optimal cardiac output and hemodynamic stability  Description: INTERVENTIONS:  - Monitor I/O, vital signs and rhythm  - Monitor for S/S and trends of decreased cardiac output  - Administer and titrate ordered vasoactive medications to optimize hemodynamic stability  - Assess quality of pulses, skin color and temperature  - Assess for signs of decreased coronary artery perfusion  - Instruct patient to report change in severity of symptoms  Outcome: Progressing  Goal: Absence of cardiac dysrhythmias or at baseline rhythm  Description: INTERVENTIONS:  - Continuous cardiac monitoring, vital signs, obtain 12 lead EKG if ordered  - Administer antiarrhythmic and heart rate control medications as ordered  - Monitor electrolytes and administer replacement therapy as ordered  Outcome: Progressing

## 2021-09-22 NOTE — PROGRESS NOTES
[facility] [facility]        NAME: Nedra Schwab is a 62 y o  male  : 1960    MRN: [id number]  DATE: 2021  TIME: 11:15 AM    Assessment and Plan   Chest pain, unspecified type [R07 9]  1  Chest pain, unspecified type           Patient Instructions     Chest pain  Referred to ER  Follow up with PCP in 3-5 days  Proceed to  ER if symptoms worsen  Chief Complaint     Chief Complaint   Patient presents with    Fatigue     for about one week     Dizziness     pt stated that he didnt feel well today and felt like his head was pulsing took b/p 200/119   started with chest heaviness today  has occassional chest heaviness for years  History of Present Illness       63 y/o male with PMH HTN presents c/o pressure like sensation to chest on/off x several days and having felt dizzy today  Denies diaphoresis, n/v      Review of Systems   Review of Systems   Constitutional: Negative  HENT: Negative  Eyes: Negative  Respiratory: Positive for chest tightness  Negative for apnea, cough, choking, shortness of breath, wheezing and stridor  Cardiovascular: Positive for chest pain  Neurological: Positive for dizziness           Current Medications       Current Outpatient Medications:     amLODIPine (NORVASC) 10 mg tablet, Take 1 tablet (10 mg total) by mouth daily, Disp: 90 tablet, Rfl: 3    Cholecalciferol (VITAMIN D3) 3000 units TABS, Take by mouth, Disp: , Rfl:     hydrochlorothiazide (HYDRODIURIL) 12 5 mg tablet, TAKE ONE TABLET BY MOUTH DAILY, Disp: 90 tablet, Rfl: 0    losartan (COZAAR) 100 MG tablet, TAKE ONE TABLET BY MOUTH DAILY, Disp: 90 tablet, Rfl: 0    venlafaxine (EFFEXOR-XR) 150 mg 24 hr capsule, TAKE ONE CAPSULE BY MOUTH DAILY, Disp: 90 capsule, Rfl: 1    Current Allergies     Allergies as of 2021 - Reviewed 2021   Allergen Reaction Noted    Lisinopril  10/09/2012    Prochlorperazine Myalgia 10/09/2012    Quetiapine  10/09/2012            The following portions of the patient's history were reviewed and updated as appropriate: allergies, current medications, past family history, past medical history, past social history, past surgical history and problem list      Past Medical History:   Diagnosis Date    Alcohol abuse     Uncomplicated    [facility] cancer ([facility] )     Last Assessed: 2/13/2013    Hearing loss     [facility]     Thinking about Suicide ([facility] )        Past Surgical History:   Procedure Laterality Date    CHOLECYSTECTOMY  1978    SPLENECTOMY  1978    due to spherocytosis       Family History   Problem Relation Age of Onset    Colon cancer Mother     Hypertension Mother         Essential    Cancer Father         Gastric         Medications have been verified  Objective   /92   Pulse 74   Temp 97 8 °F (36 6 °C)   Resp 20   Ht 5' 9" (1 753 m)   Wt 99 8 kg (220 lb)   SpO2 97%   BMI 32 49 kg/m²        Physical Exam     Physical Exam  Constitutional:       General: He is not in acute distress  Appearance: He is well-developed and normal weight  He is not diaphoretic  HENT:      Head: Normocephalic and atraumatic  Cardiovascular:      Rate and Rhythm: Normal rate and regular rhythm  Pulses: Normal pulses  Heart sounds: Murmur heard  Pulmonary:      Effort: Pulmonary effort is normal  No respiratory distress  Breath sounds: Normal breath sounds  No stridor  No wheezing, rhonchi or rales  Chest:      Chest wall: No tenderness  Musculoskeletal:      Cervical back: Normal range of motion and neck supple  Lymphadenopathy:      Cervical: No cervical adenopathy  Neurological:      Mental Status: He is alert         EKG- sinus rhythm, rate 67, T wave inversion on lateral leads

## 2021-09-22 NOTE — PROGRESS NOTES
Pt ddint take his pills yet today   Pt missed some days of taking meds   Last time he took his meds was on Sunday   ekg done

## 2021-09-23 ENCOUNTER — APPOINTMENT (OUTPATIENT)
Dept: NON INVASIVE DIAGNOSTICS | Facility: HOSPITAL | Age: 61
End: 2021-09-23
Payer: COMMERCIAL

## 2021-09-23 ENCOUNTER — APPOINTMENT (OUTPATIENT)
Dept: NUCLEAR MEDICINE | Facility: HOSPITAL | Age: 61
End: 2021-09-23
Payer: COMMERCIAL

## 2021-09-23 VITALS
DIASTOLIC BLOOD PRESSURE: 102 MMHG | WEIGHT: 222.66 LBS | BODY MASS INDEX: 32.98 KG/M2 | RESPIRATION RATE: 18 BRPM | SYSTOLIC BLOOD PRESSURE: 142 MMHG | OXYGEN SATURATION: 98 % | HEART RATE: 74 BPM | TEMPERATURE: 98 F | HEIGHT: 69 IN

## 2021-09-23 PROBLEM — E87.6 HYPOKALEMIA: Status: RESOLVED | Noted: 2021-09-22 | Resolved: 2021-09-23

## 2021-09-23 LAB
ATRIAL RATE: 67 BPM
ATRIAL RATE: 72 BPM
ATRIAL RATE: 76 BPM
P AXIS: 57 DEGREES
P AXIS: 58 DEGREES
P AXIS: 60 DEGREES
PR INTERVAL: 160 MS
PR INTERVAL: 164 MS
PR INTERVAL: 174 MS
QRS AXIS: 78 DEGREES
QRS AXIS: 91 DEGREES
QRS AXIS: 91 DEGREES
QRSD INTERVAL: 84 MS
QRSD INTERVAL: 88 MS
QRSD INTERVAL: 92 MS
QT INTERVAL: 360 MS
QT INTERVAL: 372 MS
QT INTERVAL: 442 MS
QTC INTERVAL: 405 MS
QTC INTERVAL: 407 MS
QTC INTERVAL: 467 MS
T WAVE AXIS: 150 DEGREES
T WAVE AXIS: 151 DEGREES
T WAVE AXIS: 157 DEGREES
TROPONIN I SERPL-MCNC: <0.02 NG/ML
VENTRICULAR RATE: 67 BPM
VENTRICULAR RATE: 72 BPM
VENTRICULAR RATE: 76 BPM

## 2021-09-23 PROCEDURE — 78452 HT MUSCLE IMAGE SPECT MULT: CPT | Performed by: INTERNAL MEDICINE

## 2021-09-23 PROCEDURE — G1004 CDSM NDSC: HCPCS

## 2021-09-23 PROCEDURE — 84484 ASSAY OF TROPONIN QUANT: CPT | Performed by: INTERNAL MEDICINE

## 2021-09-23 PROCEDURE — A9502 TC99M TETROFOSMIN: HCPCS

## 2021-09-23 PROCEDURE — 78452 HT MUSCLE IMAGE SPECT MULT: CPT

## 2021-09-23 PROCEDURE — 93010 ELECTROCARDIOGRAM REPORT: CPT | Performed by: INTERNAL MEDICINE

## 2021-09-23 PROCEDURE — 93306 TTE W/DOPPLER COMPLETE: CPT | Performed by: INTERNAL MEDICINE

## 2021-09-23 PROCEDURE — 93018 CV STRESS TEST I&R ONLY: CPT | Performed by: INTERNAL MEDICINE

## 2021-09-23 PROCEDURE — 93017 CV STRESS TEST TRACING ONLY: CPT

## 2021-09-23 PROCEDURE — 93306 TTE W/DOPPLER COMPLETE: CPT

## 2021-09-23 PROCEDURE — 99217 PR OBSERVATION CARE DISCHARGE MANAGEMENT: CPT | Performed by: PHYSICIAN ASSISTANT

## 2021-09-23 PROCEDURE — 93016 CV STRESS TEST SUPVJ ONLY: CPT | Performed by: INTERNAL MEDICINE

## 2021-09-23 RX ADMIN — HEPARIN SODIUM 5000 UNITS: 5000 INJECTION INTRAVENOUS; SUBCUTANEOUS at 06:10

## 2021-09-23 RX ADMIN — LOSARTAN POTASSIUM 100 MG: 50 TABLET, FILM COATED ORAL at 09:16

## 2021-09-23 RX ADMIN — REGADENOSON 0.4 MG: 0.08 INJECTION, SOLUTION INTRAVENOUS at 09:02

## 2021-09-23 RX ADMIN — VENLAFAXINE HYDROCHLORIDE 150 MG: 150 CAPSULE, EXTENDED RELEASE ORAL at 09:16

## 2021-09-23 RX ADMIN — HYDROCHLOROTHIAZIDE 12.5 MG: 12.5 TABLET ORAL at 09:16

## 2021-09-23 NOTE — UTILIZATION REVIEW
Observation Admission Authorization Request   NOTIFICATION OF OBSERVATION ADMISSION/OBSERVATION AUTHORIZATION REQUEST   SERVICING FACILITY:   [address]  Tax ID: [id number]  NPI: [id number]  Place of Service: On [address] [facility] Code: 22  CPT Code for Observation: CPT   CPT 93068     ATTENDING PROVIDER:  Attending Name and NPI#: Radha Kumar Md [[id number]]  Address: [address]  Phone: [phone]     UTILIZATION REVIEW CONTACT:  [facility] Utilization   Network Utilization Review Department  Phone: [phone]  Fax: [phone]  Email: [email or web address]     PHYSICIAN ADVISORY SERVICES:  FOR FUWC-FN-DODA REVIEW - MEDICAL NECESSITY DENIAL  Phone: [phone]  Fax: [phone]  Email: [email or web address]     TYPE OF REQUEST:  Observation  Status     ADMISSION INFORMATION:  ADMISSION DATE/TIME:   PATIENT DIAGNOSIS CODE/DESCRIPTION:  Lightheadedness [R42]  Chest pain [R07 9]  Fatigue [R53 83]  DISCHARGE DATE/TIME: No discharge date for patient encounter  DISCHARGE DISPOSITION (IF DISCHARGED): Final discharge disposition not confirmed     IMPORTANT INFORMATION:  Please contact the [facility]ya Torres directly with any questions or concerns regarding this request  Department voicemails are confidential     Send requests for admission clinical reviews, concurrent reviews, approvals, and administrative denials due to lack of clinical to fax [phone]

## 2021-09-23 NOTE — ASSESSMENT & PLAN NOTE
· YOAV is 2  · Nuclear stress testing with EF of 53%, no reproduction of symptoms and no perfusion defects noted  · Telemetry monitoring unremarkable  · Negative troponin x4    · Chest pain has resolved

## 2021-09-23 NOTE — ASSESSMENT & PLAN NOTE
· In the setting of chest pain, no syncope  · Now resolved  · Echocardiogram with preserved EF and no wall abnormalities    · No arrhythmias on telemetry

## 2021-09-23 NOTE — UTILIZATION REVIEW
Initial Clinical Review    Admission: Date/Time/Statement:   Admission Orders (From admission, onward)     Ordered        09/22/21 1415  Place in Observation  Once                   Orders Placed This Encounter   Procedures    Place in Observation     Standing Status:   Standing     Number of Occurrences:   1     Order Specific Question:   Level of Care     Answer:   Med Surg [16]     ED Arrival Information     Expected Arrival Acuity    9/22/2021 9/22/2021 12:12 Emergent         Means of arrival Escorted by Service Admission type    [facility]         Arrival complaint    Chest pain/Hypertension/Dizziness        Chief Complaint   Patient presents with    Chest Pain     Pt reports chest heaviness starting today, has felt increasingly more fatigued over the last few days +dizziness Denies SOB or nausea        Initial Presentation: 62 y o  male with a PMH of hypertension, ex-smoker who presents to ED from urgent care with chest pain that started today left-sided nonradiating associated with lightheadedness,no  shortness of breath , diaphoresis, diarrhea ,vomiting  On exam , BP elevated ,pt has murmur , chest pain has started to resolve on arrival to ED TIMI2 Pt received NTG SL and ASA in ED  Initial troponin neg, ECG nonischemic  Labs show leukocytosis 10 48 and  Potassium 3 4   Pt admitted as OBS to telemetry with chest pain and HTN   Plan- telemetry, trend troponins, stress test, echo, replete lytes and monitor, continue home BP meds  Date: 9/23     ED Triage Vitals   Temperature Pulse Respirations Blood Pressure SpO2   09/22/21 1305 09/22/21 1240 09/22/21 1240 09/22/21 1240 09/22/21 1240   98 5 °F (36 9 °C) 74 18 (!) 213/100 99 %      Temp Source Heart Rate Source Patient Position - Orthostatic VS BP Location FiO2 (%)   09/22/21 1305 09/22/21 1240 09/22/21 1240 09/22/21 1240 --   Oral Monitor Sitting Right arm       Pain Score       09/22/21 1240       2          Wt Readings from Last 1 Encounters:   09/23/21 101 kg (222 lb 10 6 oz)     Additional Vital Signs:   Date/Time  Temp  Pulse  Resp  BP  MAP (mmHg)  SpO2    09/23/21 0728  --  74  18  142/102Abnormal   --  98 %    09/22/21 2151  98 °F (36 7 °C)  60  16  152/90  --  97 %    09/22/21 2000  --  --  --  --  --  98 %    09/22/21 1720  --  --  --  140/90  --  --    09/22/21 1713  98 1 °F (36 7 °C)  69  16  185/115Abnormal   --  96 %    09/22/21 1624  --  68  18  172/101Abnormal   --  96 %    09/22/21 1430  --  74  --  155/95  120  96 %    09/22/21 1400  --  66  16  155/90  117  96 %    09/22/21 1330  --  70  16  192/106Abnormal   --  97 %    09/22/21 1305  98 5 °F (36 9 °C)  --  --  --  --  --    09/22/21 1300  --  80  18  213/115Abnormal   154  98 %        Pertinent Labs/Diagnostic Test Results:   9/22   CXR-No acute cardiopulmonary disease  ECG-ED- ECG rate:  76     ECG rate assessment: normal     Rhythm:     Rhythm: sinus rhythm     QRS:     QRS axis:  Right     QRS intervals:  Normal   Conduction:     Conduction: normal     ST segments:     ST segments:  Normal   T waves:     T waves: flattening and inverted       Flattening:  III, aVF and V4     Inverted:  I, aVL, V1, V5, V6, II and aVR   Comments:      Dynamic T wave changes when compared to ECG from earlier today      Concerning for ischemia          Results from last 7 days   Lab Units 09/22/21  1853 09/22/21  1247   WBC Thousand/uL  --  10 48*   HEMOGLOBIN g/dL  --  14 9   HEMATOCRIT %  --  42 7   PLATELETS Thousands/uL 248 263   NEUTROS ABS Thousands/µL  --  5 62         Results from last 7 days   Lab Units 09/22/21  1247   SODIUM mmol/L 139   POTASSIUM mmol/L 3 4*   CHLORIDE mmol/L 103   CO2 mmol/L 27   ANION GAP mmol/L 9   BUN mg/dL 15   CREATININE mg/dL 0 94   EGFR ml/min/1 73sq m 88   CALCIUM mg/dL 8 3     Results from last 7 days   Lab Units 09/22/21  1247   AST U/L 35   ALT U/L 49   ALK PHOS U/L 97   TOTAL PROTEIN g/dL 7 8   ALBUMIN g/dL 4 1   TOTAL BILIRUBIN mg/dL 0 66 Results from last 7 days   Lab Units 09/22/21  1247   GLUCOSE RANDOM mg/dL 101               Results from last 7 days   Lab Units 09/23/21  0100 09/22/21  2154 09/22/21  1853 09/22/21  1247   TROPONIN I ng/mL <0 02 <0 02 <0 02 <0 02           Results from last 7 days   Lab Units 09/22/21  1334   NT-PRO BNP pg/mL 67         ED Treatment:   Medication Administration from 09/22/2021 1121 to 09/22/2021 1654       Date/Time Order Dose Route Action     09/22/2021 1331 nitroglycerin (NITROSTAT) SL tablet 0 4 mg 0 4 mg Sublingual Given     09/22/2021 1428 aspirin tablet 325 mg 325 mg Oral Given        Past Medical History:   Diagnosis Date    Alcohol abuse     Uncomplicated    [facility] )     Last Assessed: 2/13/2013    Hearing loss     [facility]reditary spherocytosis ([facility] )     Thinking about Suicide ([facility] )      Present on Admission:   Obstructive sleep apnea   Hypertension   Depression      Admitting Diagnosis: Lightheadedness [R42]  Chest pain [R07 9]  Fatigue [R53 83]  Age/Sex: 62 y o  male  Admission Orders:  Scheduled Medications:  heparin (porcine), 5,000 Units, Subcutaneous, Q8H [facility]  hydrochlorothiazide, 12 5 mg, Oral, Daily  losartan, 100 mg, Oral, Daily  venlafaxine, 150 mg, Oral, Daily      Continuous IV Infusions:     PRN Meds:       Telemetry  [facility]'s    Network Utilization Review Department  ATTENTION: Please call with any questions or concerns to [phone] and carefully listen to the prompts so that you are directed to the right person  All voicemails are confidential   Jerrell Cohen all requests for admission clinical reviews, approved or denied determinations and any other requests to dedicated fax number below belonging to the campus where the patient is receiving treatment   List of dedicated fax numbers for the Facilities:  [address] DENIALS (Administrative/Medical Necessity) [phone]   [address] (Maternity/NICU/Pediatrics) [phone] 5000 [facility] Dakota Braun [phone]   [address] [address] [place] [address] [facility] [address] [phone] 4601 [facility] [phone]

## 2021-09-23 NOTE — DISCHARGE SUMMARY
[facility]  Discharge- Leonardo Martinez 1960, 62 y o  male MRN: [id number]  Unit/Bed#: S -01 Encounter: [id number]  Primary Care Provider: Brayden Jeong DO   Date and time admitted to hospital: 9/22/2021 12:34 PM    * Chest pain  Assessment & Plan  · YOAV is 2  · Nuclear stress testing with EF of 53%, no reproduction of symptoms and no perfusion defects noted  · Telemetry monitoring unremarkable  · Negative troponin x4  · Chest pain has resolved    Lightheadedness  Assessment & Plan  · In the setting of chest pain, no syncope  · Now resolved  · Echocardiogram with preserved EF and no wall abnormalities  · No arrhythmias on telemetry    Leukocytosis  Assessment & Plan  · Unclear etiology possibly related to chest pain    Hypertension  Assessment & Plan  · 140/90  · Continue home meds, hydrochlorothiazide and losartan  · Encouraged dietary and exercise modifications    Depression  Assessment & Plan  · Continue home meds    Discharging Physician / Practitioner: Heidi Richards PA-C  PCP: Brayden Jeong DO  Admission Date:   Admission Orders (From admission, onward)     Ordered        09/22/21 1415  Place in Observation  Once                   Discharge Date: 09/23/21    Medical Problems     Resolved Problems  Date Reviewed: 9/22/2021        Resolved    Hypokalemia 9/23/2021     Resolved by  Rebecca Peña, [address] [place] [address] Stay:  · None     Procedures Performed:   · Nuclear stress testing with EF of 53%, no reproduction of symptoms and no perfusion defects  Normal study      Significant Findings / Test Results:   · Troponins negative x4  · Telemetry monitor unremarkable    Incidental Findings:   · none     Test Results Pending at Discharge (will require follow up):   · none     Outpatient Tests Requested:  · none    Complications:  none    Reason for Admission: CP    Hospital Course:     Leonardo Martinez is a 62 y o  male patient who originally presented to the hospital on 9/22/2021 due to chest pain  He had left-sided nonradiating chest pain with no shortness of breath upon admission  He has had on and off chest pain for years, which was not exertional   He had normal chest x-ray  No recent illnesses  He had an episode of lightheadedness which was resolved  He was admitted for chest pain workup and ACS rule out  He had normal troponins x4  Normal telemetry monitoring  He was sent for nuclear stress testing which was normal, as above  Chest pain has resolved throughout his stay  Was encouraged to follow-up in the outpatient setting with primary care provider as well as for dietary and exercise modifications  Was encouraged to continue taking his antihypertensive medications for blood pressure control  Please see above list of diagnoses and related plan for additional information  Condition at Discharge: stable     Discharge Day Visit / Exam:     Subjective:  Patient present denying chest pain  Had normal stress testing this morning  Stable for outpatient follow-up with PCP encouraged on taking his medications as well as dietary and exercise modifications  Vitals: Blood Pressure: (!) 142/102 (09/23/21 0728)  Pulse: 74 (09/23/21 0728)  Temperature: 98 °F (36 7 °C) (09/22/21 2151)  Temp Source: Oral (09/22/21 2151)  Respirations: 18 (09/23/21 0728)  Height: 5' 9" (175 3 cm) (09/22/21 1713)  Weight - Scale: 101 kg (222 lb 10 6 oz) (09/23/21 0600)  SpO2: 98 % (09/23/21 0728)  Exam:   Physical Exam  Vitals and nursing note reviewed  Constitutional:       General: He is not in acute distress  Appearance: Normal appearance  HENT:      Head: Normocephalic  Mouth/Throat:      Mouth: Mucous membranes are moist    Eyes:      Pupils: Pupils are equal, round, and reactive to light  Cardiovascular:      Rate and Rhythm: Normal rate and regular rhythm  Heart sounds: No murmur heard       Pulmonary:      Effort: Pulmonary effort is normal  No respiratory distress  Breath sounds: Normal breath sounds  No wheezing, rhonchi or rales  Abdominal:      General: Bowel sounds are normal  There is no distension  Palpations: Abdomen is soft  Tenderness: There is no abdominal tenderness  There is no guarding  Musculoskeletal:         General: No deformity  Cervical back: Normal range of motion  Right lower leg: No edema  Left lower leg: No edema  Skin:     Capillary Refill: Capillary refill takes less than 2 seconds  Neurological:      General: No focal deficit present  Mental Status: He is alert and oriented to person, place, and time  Mental status is at baseline  Discussion with Family: patient and family at bedside    Discharge instructions/Information to patient and family:   See after visit summary for information provided to patient and family  Provisions for Follow-Up Care:  See after visit summary for information related to follow-up care and any pertinent home health orders  Disposition:     Home    For Discharges to [facility] SNF:   · Not Applicable to this Patient - Not Applicable to this Patient    Planned Readmission: no     Discharge Statement:  I spent 45 minutes discharging the patient  This time was spent on the day of discharge  I had direct contact with the patient on the day of discharge  Greater than 50% of the total time was spent examining patient, answering all patient questions, arranging and discussing plan of care with patient as well as directly providing post-discharge instructions  Additional time then spent on discharge activities  Discharge Medications:  See after visit summary for reconciled discharge medications provided to patient and family        ** Please Note: This note has been constructed using a voice recognition system **

## 2021-09-23 NOTE — DISCHARGE INSTR - AVS FIRST PAGE
Dear Arash Rodriguez,     It was our pleasure to care for you here at [facility]  It is our hope that we were always able to exceed the expected standards for your care during your stay  You were hospitalized due to chest pain  You were cared for on the 3rd floor by Carmella Bishop PA-C under the service of Francisco Rice MD with the [facility] who covers for your primary care physician (PCP), Carlos Meyer DO, while you were hospitalized  If you have any questions or concerns related to this hospitalization, you may contact us at [phone]  For follow up as well as any medication refills, we recommend that you follow up with your primary care physician  A registered nurse will reach out to you by phone within a few days after your discharge to answer any additional questions that you may have after going home  However, at this time we provide for you here, the most important instructions / recommendations at discharge:     · Notable Medication Adjustments -   · None   · Testing Required after Discharge -   · None   · Important follow up information -   · Please follow-up with your primary care provider  · Other Instructions -   · Return to the emergency department if you have recurrent severe chest pain  · Please review this entire after visit summary as additional general instructions including medication list, appointments, activity, diet, any pertinent wound care, and other additional recommendations from your care team that may be provided for you        Sincerely,     Carmella Bishop PA-C

## 2021-09-23 NOTE — H&P
[facility]  H&P- Evelyn Soria 1960, 62 y o  male MRN: [id number]  Unit/Bed#: S -01 Encounter: [id number]  Primary Care Provider: Alessandra Bacon, DO   Date and time admitted to hospital: 9/22/2021 12:34 PM    * Chest pain  Assessment & Plan  YOAV is 2  Stress test in the morning  Monitor on telemetry  Troponins times    Lightheadedness  Assessment & Plan  In the setting of chest pain no syncope  Will get echo  Patient has murmur    Leukocytosis  Assessment & Plan  Unclear etiology possibly related to chest pain    Hypokalemia  Assessment & Plan  Replace and monitor    Hypertension  Assessment & Plan  140/90  Continue home med    Depression  Assessment & Plan  Continue home meds      VTE Pharmacologic Prophylaxis: VTE Score: 3 Moderate Risk (Score 3-4) - Pharmacological DVT Prophylaxis Ordered: heparin  Code Status: Level 1 - Full Code   Discussion with family: Updated  (wife) at bedside  Anticipated Length of Stay: Patient will be admitted on an inpatient basis with an anticipated length of stay of greater than 2 midnights secondary to Chest pain  Total Time for Visit, including Counseling / Coordination of Care: 45 minutes Greater than 50% of this total time spent on direct patient counseling and coordination of care  Chief Complaint:  Chest pain    History of Present Illness:  Evelyn Soria is a 62 y o  male with a PMH of hypertension, ex-smoker who presents with chest pain  Chest pain came on today left-sided nonradiating not associated with shortness of breath or diaphoresis diarrhea vomiting  Patient states he has been having on and off chest pain like this for at least a year not exertional not related to anything that he can think of  Initial troponin is negative EKG is nonischemic  Kathleen Chavarria Review of Systems:  Review of Systems   Constitutional: Negative  HENT: Negative  Eyes: Negative  Respiratory: Negative      Cardiovascular: Positive for chest pain  Negative for palpitations and leg swelling  Gastrointestinal: Negative  Endocrine: Negative  Genitourinary: Negative  Musculoskeletal: Negative  Skin: Negative  Allergic/Immunologic: Negative  Neurological: Negative  Hematological: Negative  Psychiatric/Behavioral: Negative  Past Medical and Surgical History:   Past Medical History:   Diagnosis Date    Alcohol abuse     Uncomplicated    [facility]     Last Assessed: 2/13/2013    Hearing loss     [facility]reditary spherocytosis ([facility] )     Thinking about Suicide [facility])        Past Surgical History:   Procedure Laterality Date    CHOLECYSTECTOMY  1978    SPLENECTOMY  1978    due to spherocytosis       Meds/Allergies:  Prior to Admission medications    Medication Sig Start Date End Date Taking? Authorizing Provider   Cholecalciferol (VITAMIN D3) 3000 units TABS Take by mouth   Yes Historical Provider, MD   hydrochlorothiazide (HYDRODIURIL) 12 5 mg tablet TAKE ONE TABLET BY MOUTH DAILY 9/39/74  Yes Benja Pryblick, DO   losartan (COZAAR) 100 MG tablet TAKE ONE TABLET BY MOUTH DAILY 4/79/74  Yes Dorenda Meline Pryblick, DO   venlafaxine (EFFEXOR-XR) 150 mg 24 hr capsule TAKE ONE CAPSULE BY MOUTH DAILY 7/1/88  Yes Dorenda Meline Pryblick, DO   amLODIPine (NORVASC) 10 mg tablet Take 1 tablet (10 mg total) by mouth daily  Patient not taking: Reported on 9/22/2021 0/03/23   Pelra Rodríguez DO     I have reviewed home medications with patient personally  Allergies:    Allergies   Allergen Reactions    Lisinopril      Annotation - [id number]: tiredness    Prochlorperazine Myalgia     Annotation - [id number]: tardive dyskinesia    Quetiapine      Annotation - [id number]: sedation       Social History:  Marital Status: /Civil Union   Occupation:  Works full-time  Patient Pre-hospital Living Situation: Home  Patient Pre-hospital Level of Mobility: walks  Patient Pre-hospital Diet Restrictions:  None  Substance Use History:   Social History Substance and Sexual Activity   Alcohol Use No    Comment: Recovering Alcoholic     Social History     Tobacco Use   Smoking Status Former Smoker   Smokeless Tobacco Never Used     Social History     Substance and Sexual Activity   Drug Use Never       Family History:  Family History   Problem Relation Age of Onset    Colon cancer Mother     Hypertension Mother         Essential    Cancer Father         Gastric       Physical Exam:     Vitals:   Blood Pressure: 140/90 (09/22/21 1720)  Pulse: 69 (09/22/21 1713)  Temperature: 98 1 °F (36 7 °C) (09/22/21 1713)  Temp Source: Oral (09/22/21 1713)  Respirations: 16 (09/22/21 1713)  Height: 5' 9" (175 3 cm) (09/22/21 1713)  Weight - Scale: 101 kg (223 lb 5 2 oz) (09/22/21 1713)  SpO2: 96 % (09/22/21 1713)    Physical Exam  Constitutional:       General: He is not in acute distress  Appearance: He is not ill-appearing, toxic-appearing or diaphoretic  HENT:      Head: Normocephalic  Nose: Nose normal       Mouth/Throat:      Mouth: Mucous membranes are moist    Eyes:      Pupils: Pupils are equal, round, and reactive to light  Cardiovascular:      Rate and Rhythm: Normal rate  Heart sounds: Murmur heard  No friction rub  No gallop  Pulmonary:      Effort: Pulmonary effort is normal  No respiratory distress  Breath sounds: No rales  Chest:      Chest wall: No tenderness  Abdominal:      General: There is no distension  Palpations: There is no mass  Tenderness: There is no abdominal tenderness  There is no right CVA tenderness, left CVA tenderness, guarding or rebound  Hernia: No hernia is present  Musculoskeletal:         General: No swelling, tenderness, deformity or signs of injury  Normal range of motion  Skin:     Coloration: Skin is pale  Skin is not jaundiced  Findings: No erythema, lesion or rash  Neurological:      General: No focal deficit present  Mental Status: He is alert        Cranial Nerves: No cranial nerve deficit  Sensory: No sensory deficit  Motor: No weakness  Gait: Gait normal       Deep Tendon Reflexes: Reflexes normal    Psychiatric:         Mood and Affect: Mood normal           Additional Data:     Lab Results:  Results from last 7 days   Lab Units 09/22/21  1853 09/22/21  1247   WBC Thousand/uL  --  10 48*   HEMOGLOBIN g/dL  --  14 9   HEMATOCRIT %  --  42 7   PLATELETS Thousands/uL 248 263   NEUTROS PCT %  --  53   LYMPHS PCT %  --  33   MONOS PCT %  --  10   EOS PCT %  --  3     Results from last 7 days   Lab Units 09/22/21  1247   SODIUM mmol/L 139   POTASSIUM mmol/L 3 4*   CHLORIDE mmol/L 103   CO2 mmol/L 27   BUN mg/dL 15   CREATININE mg/dL 0 94   ANION GAP mmol/L 9   CALCIUM mg/dL 8 3   ALBUMIN g/dL 4 1   TOTAL BILIRUBIN mg/dL 0 66   ALK PHOS U/L 97   ALT U/L 49   AST U/L 35   GLUCOSE RANDOM mg/dL 101                       Imaging: Reviewed radiology reports from this admission including: chest xray  XR chest 1 view portable   ED Interpretation by Cheryl Moser DO (09/22 1326)   After discussion with attending physician it appears the blunting of the left costophrenic angle is body habitus  Final Result by Izabella Howe MD (09/22 1415)      No acute cardiopulmonary disease  Workstation performed: [id number]             EKG and Other Studies Reviewed on Admission:   · EKG: NSR  HR Normal sinus  ** Please Note: This note has been constructed using a voice recognition system   **

## 2021-09-23 NOTE — PLAN OF CARE
Problem: PAIN - ADULT  Goal: Verbalizes/displays adequate comfort level or baseline comfort level  Description: Interventions:  - Encourage patient to monitor pain and request assistance  - Assess pain using appropriate pain scale  - Administer analgesics based on type and severity of pain and evaluate response  - Implement non-pharmacological measures as appropriate and evaluate response  - Consider cultural and social influences on pain and pain management  - Notify physician/advanced practitioner if interventions unsuccessful or patient reports new pain  Outcome: Progressing     Problem: INFECTION - ADULT  Goal: Absence or prevention of progression during hospitalization  Description: INTERVENTIONS:  - Assess and monitor for signs and symptoms of infection  - Monitor lab/diagnostic results  - Monitor all insertion sites, i e  indwelling lines, tubes, and drains  - Monitor endotracheal if appropriate and nasal secretions for changes in amount and color  - [place] appropriate cooling/warming therapies per order  - Administer medications as ordered  - Instruct and encourage patient and family to use good hand hygiene technique  - Identify and instruct in appropriate isolation precautions for identified infection/condition  Outcome: Progressing  Goal: Absence of fever/infection during neutropenic period  Description: INTERVENTIONS:  - Monitor WBC    Outcome: Progressing     Problem: SAFETY ADULT  Goal: Patient will remain free of falls  Description: INTERVENTIONS:  - Educate patient/family on patient safety including physical limitations  - Instruct patient to call for assistance with activity   - Consult OT/PT to assist with strengthening/mobility   - Keep Call bell within reach  - Keep bed low and locked with side rails adjusted as appropriate  - Keep care items and personal belongings within reach  - Initiate and maintain comfort rounds  - Make Fall Risk Sign visible to staff  - Offer Toileting every  Hours, in advance of need  - Initiate/Maintain alarm  - Obtain necessary fall risk management equipment:   - Apply yellow socks and bracelet for high fall risk patients  - Consider moving patient to room near nurses station  Outcome: Progressing  Goal: Maintain or return to baseline ADL function  Description: INTERVENTIONS:  -  Assess patient's ability to carry out ADLs; assess patient's baseline for ADL function and identify physical deficits which impact ability to perform ADLs (bathing, care of mouth/teeth, toileting, grooming, dressing, etc )  - Assess/evaluate cause of self-care deficits   - Assess range of motion  - Assess patient's mobility; develop plan if impaired  - Assess patient's need for assistive devices and provide as appropriate  - Encourage maximum independence but intervene and supervise when necessary  - Involve family in performance of ADLs  - Assess for home care needs following discharge   - Consider OT consult to assist with ADL evaluation and planning for discharge  - Provide patient education as appropriate  Outcome: Progressing  Goal: Maintains/Returns to pre admission functional level  Description: INTERVENTIONS:  - Perform BMAT or MOVE assessment daily    - Set and communicate daily mobility goal to care team and patient/family/caregiver  - Collaborate with rehabilitation services on mobility goals if consulted  - Perform Range of Motion  times a day  - Reposition patient every  hours    - Dangle patient  times a day  - Stand patient  times a day  - Ambulate patient  times a day  - Out of bed to chair  times a day   - Out of bed for meals  times a day  - Out of bed for toileting  - Record patient progress and toleration of activity level   Outcome: Progressing     Problem: DISCHARGE PLANNING  Goal: Discharge to home or other facility with appropriate resources  Description: INTERVENTIONS:  - Identify barriers to discharge w/patient and caregiver  - Arrange for needed discharge resources and transportation as appropriate  - Identify discharge learning needs (meds, wound care, etc )  - Arrange for interpretive services to assist at discharge as needed  - Refer to Case Management Department for coordinating discharge planning if the patient needs post-hospital services based on physician/advanced practitioner order or complex needs related to functional status, cognitive ability, or social support system  Outcome: Progressing     Problem: Knowledge Deficit  Goal: Patient/family/caregiver demonstrates understanding of disease process, treatment plan, medications, and discharge instructions  Description: Complete learning assessment and assess knowledge base    Interventions:  - Provide teaching at level of understanding  - Provide teaching via preferred learning methods  Outcome: Progressing     Problem: CARDIOVASCULAR - ADULT  Goal: Maintains optimal cardiac output and hemodynamic stability  Description: INTERVENTIONS:  - Monitor I/O, vital signs and rhythm  - Monitor for S/S and trends of decreased cardiac output  - Administer and titrate ordered vasoactive medications to optimize hemodynamic stability  - Assess quality of pulses, skin color and temperature  - Assess for signs of decreased coronary artery perfusion  - Instruct patient to report change in severity of symptoms  Outcome: Progressing  Goal: Absence of cardiac dysrhythmias or at baseline rhythm  Description: INTERVENTIONS:  - Continuous cardiac monitoring, vital signs, obtain 12 lead EKG if ordered  - Administer antiarrhythmic and heart rate control medications as ordered  - Monitor electrolytes and administer replacement therapy as ordered  Outcome: Progressing

## 2021-09-23 NOTE — ASSESSMENT & PLAN NOTE
· 140/90  · Continue home meds, hydrochlorothiazide and losartan  · Encouraged dietary and exercise modifications

## 2021-09-23 NOTE — PHYSICAL THERAPY NOTE
PHYSICAL THERAPY CANCELLATION NOTE    Patient Name: Opal Mckinley  FKFQV'C Date: 9/23/2021 09/23/21 0309   PT Last Visit   PT Visit Date 09/23/21   Note Type   Note type Evaluation   Cancel Reasons Patient off floor/test   Assessment   Assessment PT orders received, chart review performed  Spoke to Pamela Herrera who reports pt is currently off floor at stress test  RN reports pt reportedly a self in room per report   PT will continue to follow to confirm mobility w/ pt and potentially screen off IPPT caseload vs perform PT osiris Deal, PT, DPT

## 2021-09-24 ENCOUNTER — TRANSITIONAL CARE MANAGEMENT (OUTPATIENT)
Dept: FAMILY MEDICINE CLINIC | Facility: CLINIC | Age: 61
End: 2021-09-24

## 2021-09-27 LAB
CHEST PAIN STATEMENT: NORMAL
MAX DIASTOLIC BP: 124 MMHG
MAX HEART RATE: 109 BPM
MAX PREDICTED HEART RATE: 160 BPM
MAX. SYSTOLIC BP: 196 MMHG
PROTOCOL NAME: NORMAL
REASON FOR TERMINATION: NORMAL
TARGET HR FORMULA: NORMAL
TEST INDICATION: NORMAL
TIME IN EXERCISE PHASE: NORMAL

## 2021-09-29 ENCOUNTER — OFFICE VISIT (OUTPATIENT)
Dept: FAMILY MEDICINE CLINIC | Facility: CLINIC | Age: 61
End: 2021-09-29
Payer: COMMERCIAL

## 2021-09-29 VITALS
HEART RATE: 76 BPM | HEIGHT: 69 IN | OXYGEN SATURATION: 94 % | BODY MASS INDEX: 33.18 KG/M2 | TEMPERATURE: 98 F | WEIGHT: 224 LBS | SYSTOLIC BLOOD PRESSURE: 138 MMHG | RESPIRATION RATE: 18 BRPM | DIASTOLIC BLOOD PRESSURE: 90 MMHG

## 2021-09-29 DIAGNOSIS — Z23 ENCOUNTER FOR IMMUNIZATION: ICD-10-CM

## 2021-09-29 DIAGNOSIS — R73.03 PREDIABETES: ICD-10-CM

## 2021-09-29 DIAGNOSIS — R07.9 CHEST PAIN, UNSPECIFIED TYPE: Primary | ICD-10-CM

## 2021-09-29 DIAGNOSIS — E55.9 VITAMIN D DEFICIENCY: ICD-10-CM

## 2021-09-29 DIAGNOSIS — I10 ESSENTIAL HYPERTENSION: ICD-10-CM

## 2021-09-29 PROCEDURE — 99495 TRANSJ CARE MGMT MOD F2F 14D: CPT | Performed by: FAMILY MEDICINE

## 2021-09-29 PROCEDURE — 90682 RIV4 VACC RECOMBINANT DNA IM: CPT | Performed by: FAMILY MEDICINE

## 2021-09-29 PROCEDURE — 90471 IMMUNIZATION ADMIN: CPT | Performed by: FAMILY MEDICINE

## 2021-09-29 RX ORDER — AMLODIPINE BESYLATE 2.5 MG/1
2.5 TABLET ORAL DAILY
Qty: 30 TABLET | Refills: 5 | Status: SHIPPED | OUTPATIENT
Start: 2021-09-29 | End: 2022-01-24

## 2021-09-29 RX ORDER — RIBOFLAVIN (VITAMIN B2) 100 MG
100 TABLET ORAL DAILY
COMMUNITY

## 2021-10-23 DIAGNOSIS — I10 ESSENTIAL HYPERTENSION: ICD-10-CM

## 2021-10-26 RX ORDER — LOSARTAN POTASSIUM 100 MG/1
TABLET ORAL
Qty: 90 TABLET | Refills: 0 | Status: SHIPPED | OUTPATIENT
Start: 2021-10-26 | End: 2022-01-20

## 2021-10-26 RX ORDER — HYDROCHLOROTHIAZIDE 12.5 MG/1
TABLET ORAL
Qty: 90 TABLET | Refills: 0 | Status: SHIPPED | OUTPATIENT
Start: 2021-10-26 | End: 2022-01-20

## 2022-01-19 DIAGNOSIS — I10 ESSENTIAL HYPERTENSION: ICD-10-CM

## 2022-01-20 RX ORDER — HYDROCHLOROTHIAZIDE 12.5 MG/1
TABLET ORAL
Qty: 90 TABLET | Refills: 0 | Status: SHIPPED | OUTPATIENT
Start: 2022-01-20 | End: 2022-04-18

## 2022-01-20 RX ORDER — LOSARTAN POTASSIUM 100 MG/1
TABLET ORAL
Qty: 90 TABLET | Refills: 0 | Status: SHIPPED | OUTPATIENT
Start: 2022-01-20 | End: 2022-04-18

## 2022-01-24 DIAGNOSIS — F32.A DEPRESSION, UNSPECIFIED DEPRESSION TYPE: ICD-10-CM

## 2022-01-24 DIAGNOSIS — I10 ESSENTIAL HYPERTENSION: ICD-10-CM

## 2022-01-24 RX ORDER — VENLAFAXINE HYDROCHLORIDE 150 MG/1
CAPSULE, EXTENDED RELEASE ORAL
Qty: 90 CAPSULE | Refills: 1 | Status: SHIPPED | OUTPATIENT
Start: 2022-01-24 | End: 2022-07-14

## 2022-01-24 RX ORDER — AMLODIPINE BESYLATE 2.5 MG/1
TABLET ORAL
Qty: 30 TABLET | Refills: 5 | Status: SHIPPED | OUTPATIENT
Start: 2022-01-24 | End: 2022-02-07 | Stop reason: SDUPTHER

## 2022-02-03 LAB
25(OH)D3 SERPL-MCNC: 25 NG/ML (ref 30–100)
ALBUMIN SERPL-MCNC: 4.6 G/DL (ref 3.6–5.1)
ALBUMIN/GLOB SERPL: 1.4 (CALC) (ref 1–2.5)
ALP SERPL-CCNC: 83 U/L (ref 35–144)
ALT SERPL-CCNC: 31 U/L (ref 9–46)
AST SERPL-CCNC: 25 U/L (ref 10–35)
BILIRUB SERPL-MCNC: 1 MG/DL (ref 0.2–1.2)
BUN SERPL-MCNC: 22 MG/DL (ref 7–25)
BUN/CREAT SERPL: ABNORMAL (CALC) (ref 6–22)
CALCIUM SERPL-MCNC: 9.5 MG/DL (ref 8.6–10.3)
CHLORIDE SERPL-SCNC: 103 MMOL/L (ref 98–110)
CHOLEST SERPL-MCNC: 211 MG/DL
CHOLEST/HDLC SERPL: 4.4 (CALC)
CO2 SERPL-SCNC: 24 MMOL/L (ref 20–32)
CREAT SERPL-MCNC: 1.09 MG/DL (ref 0.7–1.25)
GLOBULIN SER CALC-MCNC: 3.2 G/DL (CALC) (ref 1.9–3.7)
GLUCOSE SERPL-MCNC: 107 MG/DL (ref 65–99)
HBA1C MFR BLD: 5.6 % OF TOTAL HGB
HDLC SERPL-MCNC: 48 MG/DL
LDLC SERPL CALC-MCNC: 134 MG/DL (CALC)
NONHDLC SERPL-MCNC: 163 MG/DL (CALC)
POTASSIUM SERPL-SCNC: 4 MMOL/L (ref 3.5–5.3)
PROT SERPL-MCNC: 7.8 G/DL (ref 6.1–8.1)
PSA SERPL-MCNC: 0.4 NG/ML
SL AMB EGFR AFRICAN AMERICAN: 85 ML/MIN/1.73M2
SL AMB EGFR NON AFRICAN AMERICAN: 73 ML/MIN/1.73M2
SODIUM SERPL-SCNC: 142 MMOL/L (ref 135–146)
TRIGL SERPL-MCNC: 157 MG/DL

## 2022-02-07 ENCOUNTER — OFFICE VISIT (OUTPATIENT)
Dept: FAMILY MEDICINE CLINIC | Facility: CLINIC | Age: 62
End: 2022-02-07
Payer: COMMERCIAL

## 2022-02-07 VITALS
WEIGHT: 217.6 LBS | OXYGEN SATURATION: 97 % | SYSTOLIC BLOOD PRESSURE: 138 MMHG | TEMPERATURE: 97.8 F | HEART RATE: 75 BPM | RESPIRATION RATE: 16 BRPM | BODY MASS INDEX: 32.23 KG/M2 | HEIGHT: 69 IN | DIASTOLIC BLOOD PRESSURE: 78 MMHG

## 2022-02-07 DIAGNOSIS — I10 PRIMARY HYPERTENSION: Primary | ICD-10-CM

## 2022-02-07 DIAGNOSIS — F32.A DEPRESSION, UNSPECIFIED DEPRESSION TYPE: ICD-10-CM

## 2022-02-07 DIAGNOSIS — E78.5 HYPERLIPIDEMIA, UNSPECIFIED HYPERLIPIDEMIA TYPE: ICD-10-CM

## 2022-02-07 DIAGNOSIS — E55.9 VITAMIN D DEFICIENCY: ICD-10-CM

## 2022-02-07 DIAGNOSIS — R73.03 PREDIABETES: ICD-10-CM

## 2022-02-07 DIAGNOSIS — I10 ESSENTIAL HYPERTENSION: ICD-10-CM

## 2022-02-07 DIAGNOSIS — M25.532 LEFT WRIST PAIN: ICD-10-CM

## 2022-02-07 PROCEDURE — 99214 OFFICE O/P EST MOD 30 MIN: CPT | Performed by: NURSE PRACTITIONER

## 2022-02-07 RX ORDER — AMLODIPINE BESYLATE 5 MG/1
5 TABLET ORAL DAILY
Qty: 90 TABLET | Refills: 0 | Status: SHIPPED | OUTPATIENT
Start: 2022-02-07 | End: 2022-05-04

## 2022-02-07 NOTE — PATIENT INSTRUCTIONS
Increase vitamin d to 4,000 IU daily x 30 then decrease back to 2,000 IU daily  Complete coronary calcium score  We will follow up with results  Medication may be indicated if this shows coronary artery disease  Goal blood pressure <130/80  Increase norvasc (amlodipine) to 5 mg daily  Call with update in 1 month  Recheck labs in 6 months  Wear wrist brace on left wrist at bedtime  Follow up with hand specialist      Please call the office if you are experiencing any worsening of symptoms or no symptom improvement  Heart Healthy Diet   AMBULATORY CARE:   A heart healthy diet  is an eating plan low in unhealthy fats and sodium (salt)  The plan is high in healthy fats and fiber  A heart healthy diet helps improve your cholesterol levels and lowers your risk for heart disease and stroke  A dietitian will teach you how to read and understand food labels  Heart healthy diet guidelines to follow:   · Choose foods that contain healthy fats  ? Unsaturated fats  include monounsaturated and polyunsaturated fats  Unsaturated fat is found in foods such as soybean, canola, olive, corn, and safflower oils  It is also found in soft tub margarine that is made with liquid vegetable oil  ? Omega-3 fat  is found in certain fish, such as salmon, tuna, and trout, and in walnuts and flaxseed  Eat fish high in omega-3 fats at least 2 times a week  · Get 20 to 30 grams of fiber each day  Fruits, vegetables, whole-grain foods, and legumes (cooked beans) are good sources of fiber  · Limit or do not have unhealthy fats  ? Cholesterol  is found in animal foods, such as eggs and lobster, and in dairy products made from whole milk  Limit cholesterol to less than 200 mg each day  ? Saturated fat  is found in meats, such as myers and hamburger  It is also found in chicken or turkey skin, whole milk, and butter  Limit saturated fat to less than 7% of your total daily calories      ? Trans fat is found in packaged foods, such as potato chips and cookies  It is also in hard margarine, some fried foods, and shortening  Do not eat foods that contain trans fats  · Limit sodium as directed  You may be told to limit sodium to 2,000 to 2,300 mg each day  Choose low-sodium or no-salt-added foods  Add little or no salt to food you prepare  Use herbs and spices in place of salt  Include the following in your heart healthy plan:  Ask your dietitian or healthcare provider how many servings to have from each of the following food groups:  · Grains:      ? Whole-wheat breads, cereals, and pastas, and brown rice    ? Low-fat, low-sodium crackers and chips    · Vegetables:      ? Broccoli, green beans, green peas, and spinach    ? Collards, kale, and lima beans    ? Carrots, sweet potatoes, tomatoes, and peppers    ? Canned vegetables with no salt added    · Fruits:      ? Bananas, peaches, pears, and pineapple    ? Grapes, raisins, and dates    ? Oranges, tangerines, grapefruit, orange juice, and grapefruit juice    ? Apricots, mangoes, melons, and papaya    ? Raspberries and strawberries    ? Canned fruit with no added sugar    · Low-fat dairy:      ? Nonfat (skim) milk, 1% milk, and low-fat almond, cashew, or soy milks fortified with calcium    ? Low-fat cheese, regular or frozen yogurt, and cottage cheese    · Meats and proteins:      ? Lean cuts of beef and pork (loin, leg, round), skinless chicken and turkey    ? Legumes, soy products, egg whites, or nuts    Limit or do not include the following in your heart healthy plan:   · Unhealthy fats and oils:      ? Whole or 2% milk, cream cheese, sour cream, or cheese    ? High-fat cuts of beef (T-bone steaks, ribs), chicken or turkey with skin, and organ meats such as liver    ?  Butter, stick margarine, shortening, and cooking oils such as coconut or palm oil    · Foods and liquids high in sodium:      ? Packaged foods, such as frozen dinners, cookies, macaroni and cheese, and cereals with more than 300 mg of sodium per serving    ? Vegetables with added sodium, such as instant potatoes, vegetables with added sauces, or regular canned vegetables    ? Cured or smoked meats, such as hot dogs, myers, and sausage    ? High-sodium ketchup, barbecue sauce, salad dressing, pickles, olives, soy sauce, or miso    · Foods and liquids high in sugar:      ? Candy, cake, cookies, pies, or doughnuts    ? Soft drinks (soda), sports drinks, or sweetened tea    ? Canned or dry mixes for cakes, soups, sauces, or gravies    Other healthy heart guidelines:   · Do not smoke  Nicotine and other chemicals in cigarettes and cigars can cause lung and heart damage  Ask your healthcare provider for information if you currently smoke and need help to quit  E-cigarettes or smokeless tobacco still contain nicotine  Talk to your healthcare provider before you use these products  · Limit or do not drink alcohol as directed  Alcohol can damage your heart and raise your blood pressure  Your healthcare provider may give you specific daily and weekly limits  The general recommended limit is 1 drink a day for women 21 or older and for men 72 or older  Do not have more than 3 drinks in a day or 7 in a week  The recommended limit is 2 drinks a day for men 24to 59years of age  Do not have more than 4 drinks in a day or 14 in a week  A drink of alcohol is 12 ounces of beer, 5 ounces of wine, or 1½ ounces of liquor  · Exercise regularly  Exercise can help you maintain a healthy weight and improve your blood pressure and cholesterol levels  Regular exercise can also decrease your risk for heart problems  Ask your healthcare provider about the best exercise plan for you  Do not start an exercise program without asking your healthcare provider  Follow up with your doctor or cardiologist as directed:  Write down your questions so you remember to ask them during your visits    © Copyright [employer] 2021 Information is for [employer] use only and may not be sold, redistributed or otherwise used for commercial purposes  All illustrations and images included in CareNotes® are the copyrighted property of A D [employer] M , Inc  or Surinder Ornelas  The above information is an  only  It is not intended as medical advice for individual conditions or treatments  Talk to your doctor, nurse or pharmacist before following any medical regimen to see if it is safe and effective for you

## 2022-02-07 NOTE — PROGRESS NOTES
Assessment/Plan:   Diagnosis ICD-10-CM Associated Orders   1  Primary hypertension  I10 Comprehensive metabolic panel     CT coronary calcium score   2  Prediabetes  R73 03 Hemoglobin A1C     CT coronary calcium score   3  Hyperlipidemia, unspecified hyperlipidemia type  E78 5 Lipid panel     CT coronary calcium score   4  Depression, unspecified depression type  F32  A    5  Vitamin D deficiency  E55 9    6  Essential hypertension  I10 amLODIPine (NORVASC) 5 mg tablet   7  Left wrist pain  M25 532 Ambulatory Referral to Hand Surgery       Depression  Stable on Effexor    Hyperlipidemia  Lab Results   Component Value Date    LDLCALC 134 (H) 02/03/2022     The 10-year ASCVD risk score (Kaleigh Tracy et al , 2013) is: 12 3%    Values used to calculate the score:      Age: 62 years      Sex: Male      Is Non- : No      Diabetic: No      Tobacco smoker: No      Systolic Blood Pressure: 479 mmHg      Is BP treated: Yes      HDL Cholesterol: 48 mg/dL      Total Cholesterol: 211 mg/dL    Did have cardiac work up in September which was negative  Encouraged coronary CT  He will complete this first, if positive will start statin  Discussed diet/ exercise  Prediabetes  Lab Results   Component Value Date    HGBA1C 5 6 02/03/2022     Recheck 6 months  Hypertension  Not at goal  Will increase amlodipine to 5 mg daily  Message/call with numbers in 2-3 weeks  Goal <130/80  Asymptomatic  Please call the office if you are experiencing any worsening of symptoms or no symptom improvement  Advised to call the office for any worsening of symptoms or no symptom improvement  Patient verbalizes understand and agrees with treatment plan  Diagnoses and all orders for this visit:    Primary hypertension  -     Comprehensive metabolic panel; Future  -     CT coronary calcium score; Future    Prediabetes  -     Hemoglobin A1C; Future  -     CT coronary calcium score;  Future    Hyperlipidemia, unspecified hyperlipidemia type  -     Lipid panel; Future  -     CT coronary calcium score; Future    Depression, unspecified depression type    Vitamin D deficiency    Essential hypertension  -     amLODIPine (NORVASC) 5 mg tablet; Take 1 tablet (5 mg total) by mouth daily    Left wrist pain  -     Ambulatory Referral to Hand Surgery; Future                Subjective:        Patient ID: Carline Allan is a 62 y o  male  Chief Complaint   Patient presents with    Physical Exam    Arm Pain     left arm pain 3/10 pain scale        Here for lab work review and review of chronic conditions  Here for labs to be reviewed  Monitors BP at home, diastolic in 99A on average  Denies CP/SOB  Arm Pain: left arm pain 3/10 pain scale   Left hand to left elbow for about 1-2 months  Not constant but can get worse with activity and movement  No injury that he is aware of  He took off his wedding band because it felt tight which helped a little  Mild pain in left ring and pinky finger  Denies shoulder/ neck/ chest pain  No pain with wrist movement at this time  Unsure of pattern if pain worse in the morning  He is right handed  Pain with gripping with left hand  The following portions of the patient's history were reviewed and updated as appropriate: allergies, current medications, past family history, past social history and problem list     Review of Systems   Constitutional: Negative for chills and fever  Eyes: Negative for discharge  Respiratory: Negative for shortness of breath  Cardiovascular: Negative for chest pain  Gastrointestinal: Negative for constipation and diarrhea  Genitourinary: Negative for difficulty urinating  Musculoskeletal: Positive for arthralgias  Negative for joint swelling  Skin: Negative for rash  Neurological: Negative for headaches  Hematological: Negative for adenopathy  Psychiatric/Behavioral: The patient is not nervous/anxious            Objective:  /78 (BP Location: Left arm, Patient Position: Sitting, Cuff Size: Adult)   Pulse 75   Temp 97 8 °F (36 6 °C) (Temporal)   Resp 16   Ht 5' 9" (1 753 m)   Wt 98 7 kg (217 lb 9 6 oz)   SpO2 97%   BMI 32 13 kg/m²      Physical Exam  Vitals and nursing note reviewed  Constitutional:       General: He is not in acute distress  Appearance: He is well-developed  He is obese  He is not diaphoretic  HENT:      Head: Normocephalic and atraumatic  Right Ear: External ear normal       Left Ear: External ear normal    Eyes:      General: Lids are normal          Right eye: No discharge  Left eye: No discharge  Conjunctiva/sclera: Conjunctivae normal    Cardiovascular:      Rate and Rhythm: Normal rate and regular rhythm  Pulmonary:      Effort: Pulmonary effort is normal  No respiratory distress  Breath sounds: Normal breath sounds  No wheezing  Musculoskeletal:         General: No deformity  Normal range of motion  Cervical back: Normal range of motion and neck supple  Comments: Negative tinel negative phalen  ROM intact  Slightly decreased left  strength +4 verus right +5 but equal +5 strength with wrists/arms extension and flexion   Skin:     General: Skin is warm and dry  Neurological:      Mental Status: He is alert and oriented to person, place, and time  Psychiatric:         Speech: Speech normal          Behavior: Behavior normal          Thought Content: Thought content normal          Judgment: Judgment normal            BMI Counseling: Body mass index is 32 13 kg/m²  The BMI is above normal  Nutrition recommendations include limiting drinks that contain sugar and reducing intake of cholesterol  Exercise recommendations include exercising 3-5 times per week and strength training exercises  No pharmacotherapy was ordered  Rationale for BMI follow-up plan is due to patient being overweight or obese           Current Outpatient Medications:     amLODIPine (NORVASC) 5 mg tablet, Take 1 tablet (5 mg total) by mouth daily, Disp: 90 tablet, Rfl: 0    Ascorbic Acid (vitamin C) 100 MG tablet, Take 100 mg by mouth daily, Disp: , Rfl:     Cholecalciferol (VITAMIN D3) 3000 units TABS, Take by mouth, Disp: , Rfl:     cyanocobalamin (VITAMIN B-12) 100 MCG tablet, Take 100 mcg by mouth daily, Disp: , Rfl:     hydrochlorothiazide (HYDRODIURIL) 12 5 mg tablet, TAKE ONE TABLET BY MOUTH DAILY, Disp: 90 tablet, Rfl: 0    losartan (COZAAR) 100 MG tablet, TAKE ONE TABLET BY MOUTH DAILY, Disp: 90 tablet, Rfl: 0    Probiotic Product (PROBIOTIC-10 PO), Take by mouth, Disp: , Rfl:     venlafaxine (EFFEXOR-XR) 150 mg 24 hr capsule, TAKE ONE CAPSULE BY MOUTH DAILY, Disp: 90 capsule, Rfl: 1  Allergies   Allergen Reactions    Lisinopril      Annotation - [id number]: tiredness    Prochlorperazine Myalgia     Annotation - [id number]: tardive dyskinesia    Quetiapine      Annotation - [id number]: sedation

## 2022-02-07 NOTE — ASSESSMENT & PLAN NOTE
Not at goal  Will increase amlodipine to 5 mg daily  Message/call with numbers in 2-3 weeks  Goal <130/80  Asymptomatic  Please call the office if you are experiencing any worsening of symptoms or no symptom improvement

## 2022-02-07 NOTE — ASSESSMENT & PLAN NOTE
Lab Results   Component Value Date    LDLCALC 134 (H) 02/03/2022     The 10-year ASCVD risk score (Heather Juarez et al , 2013) is: 12 3%    Values used to calculate the score:      Age: 62 years      Sex: Male      Is Non- : No      Diabetic: No      Tobacco smoker: No      Systolic Blood Pressure: 215 mmHg      Is BP treated: Yes      HDL Cholesterol: 48 mg/dL      Total Cholesterol: 211 mg/dL    Did have cardiac work up in September which was negative  Encouraged coronary CT  He will complete this first, if positive will start statin  Discussed diet/ exercise

## 2022-02-25 DIAGNOSIS — I10 ESSENTIAL HYPERTENSION: ICD-10-CM

## 2022-02-25 RX ORDER — AMLODIPINE BESYLATE 2.5 MG/1
TABLET ORAL
Qty: 30 TABLET | Refills: 5 | Status: SHIPPED | OUTPATIENT
Start: 2022-02-25

## 2022-02-26 ENCOUNTER — TELEPHONE (OUTPATIENT)
Dept: OTHER | Facility: OTHER | Age: 62
End: 2022-02-26

## 2022-02-26 NOTE — TELEPHONE ENCOUNTER
[facility] calling in to confirm patients increase dose change of amlodipine  Per last office visit encounter with Stephanyjen Sergio, will increase amlodipine from 2 5mg to 5 mg daily  Pharmacist made aware and order was confirmed

## 2022-04-16 DIAGNOSIS — I10 ESSENTIAL HYPERTENSION: ICD-10-CM

## 2022-04-18 RX ORDER — HYDROCHLOROTHIAZIDE 12.5 MG/1
TABLET ORAL
Qty: 90 TABLET | Refills: 0 | Status: SHIPPED | OUTPATIENT
Start: 2022-04-18 | End: 2022-07-14

## 2022-04-18 RX ORDER — LOSARTAN POTASSIUM 100 MG/1
TABLET ORAL
Qty: 90 TABLET | Refills: 0 | Status: SHIPPED | OUTPATIENT
Start: 2022-04-18 | End: 2022-04-22 | Stop reason: SDUPTHER

## 2022-04-22 DIAGNOSIS — I10 ESSENTIAL HYPERTENSION: ICD-10-CM

## 2022-04-22 RX ORDER — LOSARTAN POTASSIUM 100 MG/1
100 TABLET ORAL DAILY
Qty: 90 TABLET | Refills: 0 | Status: SHIPPED | OUTPATIENT
Start: 2022-04-22 | End: 2022-07-18

## 2022-05-03 DIAGNOSIS — I10 ESSENTIAL HYPERTENSION: ICD-10-CM

## 2022-05-04 RX ORDER — AMLODIPINE BESYLATE 5 MG/1
TABLET ORAL
Qty: 90 TABLET | Refills: 0 | Status: SHIPPED | OUTPATIENT
Start: 2022-05-04 | End: 2022-07-29

## 2022-07-13 DIAGNOSIS — I10 ESSENTIAL HYPERTENSION: ICD-10-CM

## 2022-07-13 DIAGNOSIS — F32.A DEPRESSION, UNSPECIFIED DEPRESSION TYPE: ICD-10-CM

## 2022-07-14 RX ORDER — VENLAFAXINE HYDROCHLORIDE 150 MG/1
CAPSULE, EXTENDED RELEASE ORAL
Qty: 90 CAPSULE | Refills: 1 | Status: SHIPPED | OUTPATIENT
Start: 2022-07-14

## 2022-07-14 RX ORDER — HYDROCHLOROTHIAZIDE 12.5 MG/1
TABLET ORAL
Qty: 90 TABLET | Refills: 0 | Status: SHIPPED | OUTPATIENT
Start: 2022-07-14

## 2022-07-16 DIAGNOSIS — I10 ESSENTIAL HYPERTENSION: ICD-10-CM

## 2022-07-18 RX ORDER — LOSARTAN POTASSIUM 100 MG/1
TABLET ORAL
Qty: 90 TABLET | Refills: 0 | Status: SHIPPED | OUTPATIENT
Start: 2022-07-18 | End: 2022-10-11

## 2022-10-11 DIAGNOSIS — I10 ESSENTIAL HYPERTENSION: ICD-10-CM

## 2022-10-11 RX ORDER — LOSARTAN POTASSIUM 100 MG/1
TABLET ORAL
Qty: 90 TABLET | Refills: 0 | Status: SHIPPED | OUTPATIENT
Start: 2022-10-11

## 2022-12-09 DIAGNOSIS — I10 ESSENTIAL HYPERTENSION: ICD-10-CM

## 2022-12-09 RX ORDER — HYDROCHLOROTHIAZIDE 12.5 MG/1
TABLET ORAL
Qty: 90 TABLET | Refills: 0 | Status: SHIPPED | OUTPATIENT
Start: 2022-12-09

## 2022-12-14 DIAGNOSIS — F32.A DEPRESSION, UNSPECIFIED DEPRESSION TYPE: ICD-10-CM

## 2022-12-14 RX ORDER — VENLAFAXINE HYDROCHLORIDE 150 MG/1
CAPSULE, EXTENDED RELEASE ORAL
Qty: 90 CAPSULE | Refills: 1 | Status: SHIPPED | OUTPATIENT
Start: 2022-12-14

## 2023-01-04 DIAGNOSIS — I10 ESSENTIAL HYPERTENSION: ICD-10-CM

## 2023-01-04 RX ORDER — LOSARTAN POTASSIUM 100 MG/1
TABLET ORAL
Qty: 90 TABLET | Refills: 0 | Status: SHIPPED | OUTPATIENT
Start: 2023-01-04

## 2023-01-30 ENCOUNTER — TELEPHONE (OUTPATIENT)
Dept: FAMILY MEDICINE CLINIC | Facility: CLINIC | Age: 63
End: 2023-01-30

## 2023-01-31 ENCOUNTER — APPOINTMENT (OUTPATIENT)
Dept: LAB | Facility: MEDICAL CENTER | Age: 63
End: 2023-01-31

## 2023-01-31 DIAGNOSIS — R73.03 PREDIABETES: ICD-10-CM

## 2023-01-31 DIAGNOSIS — E78.5 HYPERLIPIDEMIA, UNSPECIFIED HYPERLIPIDEMIA TYPE: ICD-10-CM

## 2023-01-31 DIAGNOSIS — I10 PRIMARY HYPERTENSION: ICD-10-CM

## 2023-01-31 LAB
ALBUMIN SERPL BCP-MCNC: 4.1 G/DL (ref 3.5–5)
ALP SERPL-CCNC: 92 U/L (ref 46–116)
ALT SERPL W P-5'-P-CCNC: 51 U/L (ref 12–78)
ANION GAP SERPL CALCULATED.3IONS-SCNC: 7 MMOL/L (ref 4–13)
AST SERPL W P-5'-P-CCNC: 32 U/L (ref 5–45)
BILIRUB SERPL-MCNC: 0.86 MG/DL (ref 0.2–1)
BUN SERPL-MCNC: 20 MG/DL (ref 5–25)
CALCIUM SERPL-MCNC: 9.3 MG/DL (ref 8.3–10.1)
CHLORIDE SERPL-SCNC: 108 MMOL/L (ref 96–108)
CHOLEST SERPL-MCNC: 217 MG/DL
CO2 SERPL-SCNC: 24 MMOL/L (ref 21–32)
CREAT SERPL-MCNC: 1.04 MG/DL (ref 0.6–1.3)
EST. AVERAGE GLUCOSE BLD GHB EST-MCNC: 126 MG/DL
GFR SERPL CREATININE-BSD FRML MDRD: 77 ML/MIN/1.73SQ M
GLUCOSE P FAST SERPL-MCNC: 149 MG/DL (ref 65–99)
HBA1C MFR BLD: 6 %
HDLC SERPL-MCNC: 43 MG/DL
LDLC SERPL CALC-MCNC: 143 MG/DL (ref 0–100)
NONHDLC SERPL-MCNC: 174 MG/DL
POTASSIUM SERPL-SCNC: 3.9 MMOL/L (ref 3.5–5.3)
PROT SERPL-MCNC: 7.6 G/DL (ref 6.4–8.4)
SODIUM SERPL-SCNC: 139 MMOL/L (ref 135–147)
TRIGL SERPL-MCNC: 154 MG/DL

## 2023-02-01 ENCOUNTER — OFFICE VISIT (OUTPATIENT)
Dept: FAMILY MEDICINE CLINIC | Facility: CLINIC | Age: 63
End: 2023-02-01

## 2023-02-01 VITALS
DIASTOLIC BLOOD PRESSURE: 64 MMHG | SYSTOLIC BLOOD PRESSURE: 114 MMHG | HEIGHT: 69 IN | OXYGEN SATURATION: 96 % | BODY MASS INDEX: 33.59 KG/M2 | HEART RATE: 82 BPM | WEIGHT: 226.8 LBS | TEMPERATURE: 97.6 F | RESPIRATION RATE: 16 BRPM

## 2023-02-01 DIAGNOSIS — Z12.83 SKIN CANCER SCREENING: ICD-10-CM

## 2023-02-01 DIAGNOSIS — F32.A DEPRESSION, UNSPECIFIED DEPRESSION TYPE: ICD-10-CM

## 2023-02-01 DIAGNOSIS — D49.2 SKIN NEOPLASM: ICD-10-CM

## 2023-02-01 DIAGNOSIS — R73.03 PREDIABETES: ICD-10-CM

## 2023-02-01 DIAGNOSIS — G47.33 OSA (OBSTRUCTIVE SLEEP APNEA): ICD-10-CM

## 2023-02-01 DIAGNOSIS — E78.00 PURE HYPERCHOLESTEROLEMIA: ICD-10-CM

## 2023-02-01 DIAGNOSIS — Z12.5 PROSTATE CANCER SCREENING: ICD-10-CM

## 2023-02-01 DIAGNOSIS — I10 PRIMARY HYPERTENSION: Primary | ICD-10-CM

## 2023-02-04 PROBLEM — R42 LIGHTHEADEDNESS: Status: RESOLVED | Noted: 2021-09-22 | Resolved: 2023-02-04

## 2023-02-04 PROBLEM — R07.9 CHEST PAIN: Status: RESOLVED | Noted: 2021-09-22 | Resolved: 2023-02-04

## 2023-02-04 NOTE — PROGRESS NOTES
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  A1c 6 0  CMP stable  LDL cholesterol 143  Recommend CT coronary calcium score  Agrees  Normally will check every 6 months with labs but this will be depending on CT coronary calcium score  Aware that this could lead to statin usage and cardiology evaluation  No problem-specific Assessment & Plan notes found for this encounter  Diagnoses and all orders for this visit:    Prediabetes  -     Hemoglobin A1C; Future    Need for hepatitis C screening test    Screening for HIV (human immunodeficiency virus)    Encounter for immunization    Pure hypercholesterolemia  -     CT coronary calcium score; Future  -     Comprehensive metabolic panel; Future  -     Lipid panel; Future  -     Hemoglobin A1C; Future    Prostate cancer screening  -     PSA, Total Screen; Future    Skin cancer screening  -     Ambulatory Referral to Dermatology; Future    Skin neoplasm  -     Ambulatory Referral to Plastic Surgery; Future    LESIA (obstructive sleep apnea)  -     Ambulatory Referral to Sleep Medicine; Future        1  Prediabetes  Hemoglobin A1C      2  Need for hepatitis C screening test        3  Screening for HIV (human immunodeficiency virus)        4  Encounter for immunization        5  Pure hypercholesterolemia  CT coronary calcium score    Comprehensive metabolic panel    Lipid panel    Hemoglobin A1C      6  Prostate cancer screening  PSA, Total Screen      7  Skin cancer screening  Ambulatory Referral to Dermatology      8  Skin neoplasm  Ambulatory Referral to Plastic Surgery      9   LESIA (obstructive sleep apnea)  Ambulatory Referral to Sleep Medicine          Subjective: Patient ID: Aaron Aquino is a 65 y o  male  Chief Complaint   Patient presents with   • Follow-up     Patient would like to discuss sleep apnea        BP and lab ck  Doing very well      The following portions of the patient's history were reviewed and updated as appropriate: past medical history, past surgical history and problem list       Review of Systems   Constitutional: Negative for appetite change, fatigue, fever and unexpected weight change  HENT: Negative for congestion, ear pain, postnasal drip, rhinorrhea, sinus pressure, sinus pain and sore throat  Eyes: Negative for redness and visual disturbance  Respiratory: Negative for chest tightness and shortness of breath  Cardiovascular: Negative for chest pain, palpitations and leg swelling  Gastrointestinal: Negative for abdominal distention, abdominal pain, diarrhea and nausea  Endocrine: Negative for cold intolerance and heat intolerance  Genitourinary: Negative for dysuria and hematuria  Musculoskeletal: Negative for arthralgias, gait problem and myalgias  Skin: Negative for pallor and rash  Neurological: Negative for dizziness, tremors, weakness, light-headedness and headaches  Psychiatric/Behavioral: Negative for behavioral problems  The patient is not nervous/anxious  Objective:  /64 (BP Location: Right arm, Patient Position: Sitting, Cuff Size: Adult)   Pulse 82   Temp 97 6 °F (36 4 °C) (Temporal)   Resp 16   Ht 5' 9" (1 753 m)   Wt 103 kg (226 lb 12 8 oz)   SpO2 96%   BMI 33 49 kg/m²        Physical Exam  Vitals and nursing note reviewed  Constitutional:       General: He is not in acute distress  Appearance: Normal appearance  He is obese  He is not diaphoretic  HENT:      Head: Normocephalic and atraumatic  Eyes:      General: No scleral icterus  Conjunctiva/sclera: Conjunctivae normal       Pupils: Pupils are equal, round, and reactive to light  Neck:      Thyroid: No thyromegaly  Cardiovascular:      Rate and Rhythm: Normal rate and regular rhythm  Pulses:           Carotid pulses are 0 on the right side and 0 on the left side  Heart sounds: Normal heart sounds  No murmur heard  Pulmonary:      Effort: Pulmonary effort is normal  No respiratory distress  Breath sounds: Normal breath sounds  No wheezing  Abdominal:      General: There is no distension  Musculoskeletal:      Cervical back: Neck supple  Right lower leg: No edema  Left lower leg: No edema  Lymphadenopathy:      Cervical: No cervical adenopathy  Skin:     General: Skin is warm  Coloration: Skin is not pale  Findings: Lesion present  Comments: Fleshy growth below the left eye   Neurological:      General: No focal deficit present  Mental Status: He is alert and oriented to person, place, and time  Cranial Nerves: No cranial nerve deficit  Deep Tendon Reflexes: Reflexes are normal and symmetric  Psychiatric:         Mood and Affect: Mood normal          Behavior: Behavior normal          Thought Content:  Thought content normal          Judgment: Judgment normal

## 2023-02-06 ENCOUNTER — TELEPHONE (OUTPATIENT)
Dept: PLASTIC SURGERY | Facility: CLINIC | Age: 63
End: 2023-02-06

## 2023-02-16 ENCOUNTER — HOSPITAL ENCOUNTER (OUTPATIENT)
Dept: CT IMAGING | Facility: HOSPITAL | Age: 63
Discharge: HOME | End: 2023-02-16

## 2023-02-16 DIAGNOSIS — E78.00 PURE HYPERCHOLESTEROLEMIA: ICD-10-CM

## 2023-02-21 ENCOUNTER — TELEPHONE (OUTPATIENT)
Dept: PLASTIC SURGERY | Facility: CLINIC | Age: 63
End: 2023-02-21

## 2023-02-21 NOTE — TELEPHONE ENCOUNTER
Left message for patient to schedule an appt with us regarding a referral we have  Gave main office number to call back [phone]

## 2023-03-06 DIAGNOSIS — I10 ESSENTIAL HYPERTENSION: ICD-10-CM

## 2023-03-06 RX ORDER — HYDROCHLOROTHIAZIDE 12.5 MG/1
12.5 TABLET ORAL DAILY
Qty: 90 TABLET | Refills: 0 | Status: SHIPPED | OUTPATIENT
Start: 2023-03-06

## 2023-03-29 DIAGNOSIS — I10 ESSENTIAL HYPERTENSION: ICD-10-CM

## 2023-03-29 RX ORDER — AMLODIPINE BESYLATE 5 MG/1
TABLET ORAL
Qty: 90 TABLET | Refills: 1 | Status: SHIPPED | OUTPATIENT
Start: 2023-03-29

## 2023-03-30 DIAGNOSIS — I10 ESSENTIAL HYPERTENSION: ICD-10-CM

## 2023-03-30 RX ORDER — LOSARTAN POTASSIUM 100 MG/1
TABLET ORAL
Qty: 90 TABLET | Refills: 0 | Status: SHIPPED | OUTPATIENT
Start: 2023-03-30

## 2023-05-03 DIAGNOSIS — I10 ESSENTIAL HYPERTENSION: ICD-10-CM

## 2023-05-03 RX ORDER — AMLODIPINE BESYLATE 5 MG/1
TABLET ORAL
Qty: 90 TABLET | Refills: 1 | Status: SHIPPED | OUTPATIENT
Start: 2023-05-03

## 2023-05-03 RX ORDER — LOSARTAN POTASSIUM 100 MG/1
TABLET ORAL
Qty: 90 TABLET | Refills: 0 | Status: SHIPPED | OUTPATIENT
Start: 2023-05-03

## 2023-05-04 ENCOUNTER — CONSULT (OUTPATIENT)
Dept: PLASTIC SURGERY | Facility: CLINIC | Age: 63
End: 2023-05-04

## 2023-05-04 VITALS
HEIGHT: 69 IN | BODY MASS INDEX: 32.58 KG/M2 | HEART RATE: 75 BPM | SYSTOLIC BLOOD PRESSURE: 174 MMHG | DIASTOLIC BLOOD PRESSURE: 111 MMHG | WEIGHT: 220 LBS | TEMPERATURE: 97 F

## 2023-05-04 DIAGNOSIS — L98.9 SKIN LESION: Primary | ICD-10-CM

## 2023-05-04 NOTE — PROGRESS NOTES
Assessment/Plan:    Patient is a 81-year-old male who is referred by his PCP for evaluation of skin lesions of the left lower and left upper eyelid, as well as right eyelid  Please see HPI  I discussed surgical excision with complex closure  Patient understood and agreed  This will be done under general versus local anesthesia  Discussed options, including forgoing surgery, as well as benefits and risks of surgery including but not limited to anesthesia, bleeding, infection, scarring and potential need for additional procedures  Consent was obtained and all questions answered to their satisfaction  We will plan for surgery at their earliest convenience  No problem-specific Assessment & Plan notes found for this encounter  Diagnoses and all orders for this visit:    Skin lesion          Subjective:      Patient ID: Chey Amezcua is a 59 y o  male  HPI     Patient presents to the office today after referral from his PCP  The patient reports that he has had multiple skin tags in the past, but that he has 1 pronounced skin tag under his left eye and one above his left eye that are now impeding his field of vision  He has never had any skin tags removed in the past   Patient does have hypertension but no other medical problems  He is a non-smoker  Upon evaluation, the patient has 2 pedunculated skin tags located on his left lower eyelid, there is 1 round pedunculated lesion on his left upper eyelid  There were 3 small, pedunculated lesions on his right lateral eyelid which were removed today without incident  The following portions of the patient's history were reviewed and updated as appropriate: He  has a past medical history of Alcohol abuse, Colon cancer ([facility] [address] ), Hearing loss, Hereditary spherocytosis ([facility] [address] ), and Thinking about Suicide ([facility] [address] )    He   Patient Active Problem List    Diagnosis Date Noted    Skin lesion 05/04/2023    Leukocytosis 09/22/2021    Prediabetes 04/18/2018    Systolic ejection murmur 72/24/6392    Obesity 03/26/2015    Vitamin D deficiency 03/26/2015    Benign prostatic hyperplasia 08/13/2013    Hyperlipidemia 02/13/2013    Depression 10/09/2012    Bipolar I disorder, single manic episode ([facility] [address] ) 10/09/2012    Obstructive sleep apnea 10/09/2012    Hypertension 06/06/2012     He  has a past surgical history that includes Cholecystectomy (1978) and Splenectomy (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)  His family history includes Cancer in his father; Colon cancer in his mother; Hypertension in his mother  He  reports that he has quit smoking  He has never used smokeless tobacco  He reports that he does not drink alcohol and does not use drugs  Current Outpatient Medications   Medication Sig Dispense Refill    amLODIPine (NORVASC) 5 mg tablet TAKE ONE TABLET BY MOUTH DAILY 90 tablet 1    Ascorbic Acid (vitamin C) 100 MG tablet Take 100 mg by mouth daily      Cholecalciferol (VITAMIN D3) 3000 units TABS Take by mouth      cyanocobalamin (VITAMIN B-12) 100 MCG tablet Take 100 mcg by mouth daily      hydrochlorothiazide (HYDRODIURIL) 12 5 mg tablet Take 1 tablet (12 5 mg total) by mouth daily 90 tablet 0    losartan (COZAAR) 100 MG tablet TAKE ONE TABLET BY MOUTH DAILY 90 tablet 0    Probiotic Product (PROBIOTIC-10 PO) Take by mouth      venlafaxine (EFFEXOR-XR) 150 mg 24 hr capsule TAKE ONE CAPSULE BY MOUTH DAILY 90 capsule 1    amLODIPine (NORVASC) 2 5 mg tablet TAKE ONE TABLET BY MOUTH DAILY 30 tablet 5     No current facility-administered medications for this visit       Current Outpatient Medications on File Prior to Visit   Medication Sig    amLODIPine (NORVASC) 5 mg tablet TAKE ONE TABLET BY MOUTH DAILY    Ascorbic Acid (vitamin C) 100 MG tablet Take 100 mg by mouth daily    Cholecalciferol (VITAMIN D3) 3000 units TABS Take by mouth    cyanocobalamin (VITAMIN B-12) 100 MCG tablet Take 100 mcg by mouth daily    hydrochlorothiazide (HYDRODIURIL) 12 5 mg tablet Take 1 tablet (12 5 mg "total) by mouth daily    losartan (COZAAR) 100 MG tablet TAKE ONE TABLET BY MOUTH DAILY    Probiotic Product (PROBIOTIC-10 PO) Take by mouth    venlafaxine (EFFEXOR-XR) 150 mg 24 hr capsule TAKE ONE CAPSULE BY MOUTH DAILY    amLODIPine (NORVASC) 2 5 mg tablet TAKE ONE TABLET BY MOUTH DAILY     No current facility-administered medications on file prior to visit  He is allergic to lisinopril, prochlorperazine, and quetiapine       Review of Systems    A 12 point ROS was completed and is negative except as HPI  Objective:      BP (!) 174/111   Pulse 75   Temp (!) 97 °F (36 1 °C)   Ht 5' 9\" (1 753 m)   Wt 99 8 kg (220 lb)   BMI 32 49 kg/m²          Physical Exam  Vitals and nursing note reviewed  Constitutional:       General: He is not in acute distress  Appearance: Normal appearance  He is obese  He is not ill-appearing, toxic-appearing or diaphoretic  HENT:      Head: Normocephalic and atraumatic  Nose: Nose normal       Mouth/Throat:      Mouth: Mucous membranes are moist    Eyes:      Extraocular Movements: Extraocular movements intact  Conjunctiva/sclera: Conjunctivae normal       Pupils: Pupils are equal, round, and reactive to light  Cardiovascular:      Rate and Rhythm: Normal rate and regular rhythm  Pulmonary:      Effort: Pulmonary effort is normal  No respiratory distress  Breath sounds: Normal breath sounds  Abdominal:      General: Abdomen is flat  Palpations: Abdomen is soft  Musculoskeletal:         General: No swelling, tenderness, deformity or signs of injury  Normal range of motion  Cervical back: Normal range of motion and neck supple  No rigidity or tenderness  Skin:     General: Skin is warm and dry  Comments: See HPI   Neurological:      General: No focal deficit present  Mental Status: He is alert and oriented to person, place, and time  Cranial Nerves: No cranial nerve deficit  Sensory: No sensory deficit        " Motor: No weakness     Psychiatric:         Mood and Affect: Mood normal          Behavior: Behavior normal

## 2023-05-10 ENCOUNTER — PREP FOR PROCEDURE (OUTPATIENT)
Dept: PLASTIC SURGERY | Facility: CLINIC | Age: 63
End: 2023-05-10

## 2023-05-10 DIAGNOSIS — L98.9 SKIN LESION: Primary | ICD-10-CM

## 2023-05-23 ENCOUNTER — OFFICE VISIT (OUTPATIENT)
Dept: URGENT CARE | Facility: MEDICAL CENTER | Age: 63
End: 2023-05-23

## 2023-05-23 VITALS
OXYGEN SATURATION: 98 % | BODY MASS INDEX: 32.58 KG/M2 | SYSTOLIC BLOOD PRESSURE: 165 MMHG | HEART RATE: 73 BPM | HEIGHT: 69 IN | RESPIRATION RATE: 19 BRPM | TEMPERATURE: 97.4 F | WEIGHT: 220 LBS | DIASTOLIC BLOOD PRESSURE: 87 MMHG

## 2023-05-23 DIAGNOSIS — S20.311A ABRASION OF RIGHT CHEST WALL, INITIAL ENCOUNTER: Primary | ICD-10-CM
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
## 2023-05-23 RX ORDER — GINSENG 100 MG
1 CAPSULE ORAL ONCE
Status: COMPLETED | OUTPATIENT
Start: 2023-05-23 | End: 2023-05-23

## 2023-05-23 RX ADMIN — Medication 1 SMALL APPLICATION: at 14:25

## 2023-05-23 NOTE — PROGRESS NOTES
"  [facility]        NAME: Sherie Douglas is a 59 y o  male  : 1960    MRN: [id number]  DATE: May 23, 2023  TIME: 2:21 PM    Assessment and Plan   Abrasion of right chest wall, initial encounter [S20 311A]  1  Abrasion of right chest wall, initial encounter  Tdap Vaccine greater than or equal to 8yo    bacitracin topical ointment 1 small application      2  Fall, initial encounter          Tdap updated in office and bacitracin applied to wound on right posterior chest wall  Patient declining imaging at this time  Advised patient to follow-up closely with PCP and/or report to ER if develop shortness of breath and/or mentation changes  Wife at bedside verbalizes understanding and agreeable to plan  Patient Instructions     Clean wound and apply antibiotic ointment (bacitracin) daily for next 5-7 days and keep area covered while healing  May take tylenol/ibuprofen every 4-6 hours and apply over-the-counter lidocaine patches (12 hours on, 12 hours off) as needed  Follow-up with PCP in 3-5 days if no improvement of symptoms  Report to ER if symptoms worsen or develop shortness of breath or changes in mentation  Chief Complaint     Chief Complaint   Patient presents with   • Fall     Pt fell today in the shower, pt hit the back of his head on the wall and his back landed on the faucet  Pt did not lose consciousness, pt does not take blood thinners  Pt has an abrasion on his back and a red lump on the back of his head  History of Present Illness       59year old male presents with wife s/p fall that occurred around 6 AM today  Reports he was in the shower and slipped striking the \"porcelin sink and metal bar faucet in shower  \"  He reports positive headstrike resulting in small abrasion to head and right posterior chest  Denies LOC or use of anti-coagulants  He is unsure of his last tetanus immunization  Denies current pain or shortness of breath   He has not cleaned off wound or taken " anything for pain prior to arrival      Fall  The accident occurred 1 to 3 hours ago  The fall occurred while standing  He fell from a height of 3 to 5 ft  He landed on hard floor  The volume of blood lost was minimal  The point of impact was the head  The pain is present in the head and back  The pain is at a severity of 2/10  The pain is mild  The symptoms are aggravated by pressure on injury  Pertinent negatives include no abdominal pain, bowel incontinence, fever, headaches, hearing loss, hematuria, loss of consciousness, nausea, numbness, tingling, visual change or vomiting  He has tried nothing for the symptoms  The treatment provided no relief  Review of Systems   Review of Systems   Constitutional: Negative for activity change, appetite change, chills, fatigue and fever  Eyes: Negative for visual disturbance  Respiratory: Negative for chest tightness and shortness of breath  Cardiovascular: Negative for chest pain and palpitations  Gastrointestinal: Negative for abdominal pain, bowel incontinence, constipation, diarrhea, nausea and vomiting  Genitourinary: Negative for hematuria  Musculoskeletal: Negative for arthralgias, back pain and myalgias  Skin: Positive for color change and wound  Negative for pallor and rash  Neurological: Negative for dizziness, tingling, loss of consciousness, weakness, light-headedness, numbness and headaches  Hematological: Negative for adenopathy  Does not bruise/bleed easily           Current Medications       Current Outpatient Medications:   •  amLODIPine (NORVASC) 5 mg tablet, TAKE ONE TABLET BY MOUTH DAILY, Disp: 90 tablet, Rfl: 1  •  Ascorbic Acid (vitamin C) 100 MG tablet, Take 100 mg by mouth daily, Disp: , Rfl:   •  Cholecalciferol (VITAMIN D3) 3000 units TABS, Take by mouth, Disp: , Rfl:   •  cyanocobalamin (VITAMIN B-12) 100 MCG tablet, Take 100 mcg by mouth daily, Disp: , Rfl:   •  hydrochlorothiazide (HYDRODIURIL) 12 5 mg tablet, Take 1 "tablet (12 5 mg total) by mouth daily, Disp: 90 tablet, Rfl: 0  •  losartan (COZAAR) 100 MG tablet, TAKE ONE TABLET BY MOUTH DAILY, Disp: 90 tablet, Rfl: 0  •  Probiotic Product (PROBIOTIC-10 PO), Take by mouth, Disp: , Rfl:   •  venlafaxine (EFFEXOR-XR) 150 mg 24 hr capsule, TAKE ONE CAPSULE BY MOUTH DAILY, Disp: 90 capsule, Rfl: 1    Current Facility-Administered Medications:   •  bacitracin topical ointment 1 small application, 1 small application, Topical, Once, Oval Fan, CRNP    Current Allergies     Allergies as of 05/23/2023 - Reviewed 05/23/2023   Allergen Reaction Noted   • Lisinopril  10/09/2012   • Prochlorperazine Myalgia 10/09/2012   • Quetiapine  10/09/2012            The following portions of the patient's history were reviewed and updated as appropriate: allergies, current medications, past family history, past medical history, past social history, past surgical history and problem list      Past Medical History:   Diagnosis Date   • Alcohol abuse     Uncomplicated   • Colon cancer ([facility] [address] )     Last Assessed: 2/13/2013   • Hearing loss    • Hereditary spherocytosis ([facility] [address] )    • Thinking about Suicide [facility])        Past Surgical History:   Procedure Laterality Date   • CHOLECYSTECTOMY  1978   • SPLENECTOMY  1978    due to spherocytosis       Family History   Problem Relation Age of Onset   • Colon cancer Mother    • Hypertension Mother         Essential   • Cancer Father         Gastric         Medications have been verified  Objective   /87   Pulse 73   Temp (!) 97 4 °F (36 3 °C) (Temporal)   Resp 19   Ht 5' 9\" (1 753 m)   Wt 99 8 kg (220 lb)   SpO2 98%   BMI 32 49 kg/m²        Physical Exam     Physical Exam  Vitals and nursing note reviewed  Constitutional:       General: He is awake  Appearance: Normal appearance  He is well-developed and normal weight  HENT:      Head: Normocephalic  Abrasion present  No contusion, masses or laceration   Hair is normal       Jaw: " There is normal jaw occlusion  Right Ear: Hearing normal       Left Ear: Hearing normal       Nose: No congestion or rhinorrhea  Mouth/Throat:      Lips: Pink  Mouth: Mucous membranes are moist       Pharynx: Oropharynx is clear  Uvula midline  Tonsils: No tonsillar exudate or tonsillar abscesses  2+ on the right  2+ on the left  Eyes:      Pupils: Pupils are equal, round, and reactive to light  Visual Fields: Right eye visual fields normal and left eye visual fields normal    Cardiovascular:      Rate and Rhythm: Normal rate and regular rhythm  Pulses: Normal pulses  Heart sounds: Normal heart sounds  Musculoskeletal:         General: Tenderness and signs of injury present  No swelling or deformity  Normal range of motion  Cervical back: Full passive range of motion without pain, normal range of motion and neck supple  Lymphadenopathy:      Cervical: No cervical adenopathy  Skin:     General: Skin is warm and dry  Capillary Refill: Capillary refill takes less than 2 seconds  Findings: Abrasion, signs of injury and wound present  No abscess, acne, bruising, burn, ecchymosis, erythema, laceration, lesion, petechiae or rash  Comments: Small abrasion (2 cm) to right posterior chest, bleeding controlled   Neurological:      Mental Status: He is alert and oriented to person, place, and time  GCS: GCS eye subscore is 4  GCS verbal subscore is 5  GCS motor subscore is 6  Cranial Nerves: Cranial nerves 2-12 are intact  Psychiatric:         Attention and Perception: Attention and perception normal          Mood and Affect: Mood and affect normal          Speech: Speech normal          Behavior: Behavior normal  Behavior is cooperative  Thought Content:  Thought content normal          Cognition and Memory: Cognition and memory normal          Judgment: Judgment normal

## 2023-05-23 NOTE — PATIENT INSTRUCTIONS
Clean wound and apply antibiotic ointment (bacitracin) daily for next 5-7 days and keep area covered while healing  May take tylenol/ibuprofen every 4-6 hours and apply over-the-counter lidocaine patches (12 hours on, 12 hours off) as needed  Follow-up with PCP in 3-5 days if no improvement of symptoms  Report to ER if symptoms worsen or develop shortness of breath or changes in mentation

## 2023-05-30 DIAGNOSIS — I10 ESSENTIAL HYPERTENSION: ICD-10-CM

## 2023-05-30 RX ORDER — HYDROCHLOROTHIAZIDE 12.5 MG/1
TABLET ORAL
Qty: 90 TABLET | Refills: 0 | Status: SHIPPED | OUTPATIENT
Start: 2023-05-30

## 2023-06-07 DIAGNOSIS — F32.A DEPRESSION, UNSPECIFIED DEPRESSION TYPE: ICD-10-CM

## 2023-06-08 RX ORDER — VENLAFAXINE HYDROCHLORIDE 150 MG/1
CAPSULE, EXTENDED RELEASE ORAL
Qty: 90 CAPSULE | Refills: 1 | Status: SHIPPED | OUTPATIENT
Start: 2023-06-08

## 2023-06-23 DIAGNOSIS — I10 ESSENTIAL HYPERTENSION: ICD-10-CM

## 2023-06-23 RX ORDER — LOSARTAN POTASSIUM 100 MG/1
TABLET ORAL
Qty: 90 TABLET | Refills: 0 | Status: SHIPPED | OUTPATIENT
Start: 2023-06-23

## 2023-07-12 DIAGNOSIS — I10 ESSENTIAL HYPERTENSION: ICD-10-CM

## 2023-07-12 RX ORDER — LOSARTAN POTASSIUM 100 MG/1
TABLET ORAL
Qty: 90 TABLET | Refills: 0 | Status: SHIPPED | OUTPATIENT
Start: 2023-07-12

## 2023-08-03 ENCOUNTER — OFFICE VISIT (OUTPATIENT)
Dept: SLEEP CENTER | Facility: CLINIC | Age: 63
End: 2023-08-03
Payer: COMMERCIAL

## 2023-08-03 VITALS
HEIGHT: 69 IN | BODY MASS INDEX: 33.33 KG/M2 | DIASTOLIC BLOOD PRESSURE: 83 MMHG | WEIGHT: 225 LBS | SYSTOLIC BLOOD PRESSURE: 123 MMHG

## 2023-08-03 DIAGNOSIS — G47.33 OBSTRUCTIVE SLEEP APNEA: Primary | ICD-10-CM

## 2023-08-03 DIAGNOSIS — G47.33 OSA (OBSTRUCTIVE SLEEP APNEA): ICD-10-CM

## 2023-08-03 PROCEDURE — 99244 OFF/OP CNSLTJ NEW/EST MOD 40: CPT | Performed by: PHYSICIAN ASSISTANT

## 2023-08-03 NOTE — PATIENT INSTRUCTIONS
Please schedule your home sleep study as soon as possible. I would recommend doing the study in a recliner chair as you normally sleep. Once the study is completed, a nurse will call you and review the results. Likely this will show sleep apnea and we will order you a new CPAP machine. We would then want to see you back 31 to 90 days after you start treatment in order to review compliance with you. Nursing Support:  When: Monday through Friday 7A-5PM except holidays  Where: Our direct line is [phone]. If you are having a true emergency please call 911. In the event that the line is busy or it is after hours please leave a voice message and we will return your call. Please speak clearly, leaving your full name, birth date, best number to reach you and the reason for your call. Medication refills: We will need the name of the medication, the dosage, the ordering provider, whether you get a 30 or 90 day refill, and the pharmacy name and address. Medications will be ordered by the provider only. Nurses cannot call in prescriptions. Please allow 7 days for medication refills. Physician requested updates: If your provider requested that you call with an update after starting medication, please be ready to provide us the medication and dosage, what time you take your medication, the time you attempt to fall asleep, time you fall asleep, when you wake up, and what time you get out of bed. Sleep Study Results: We will contact you with sleep study results and/or next steps after the physician has reviewed your testing.

## 2023-08-03 NOTE — PROGRESS NOTES
Consultation - [id number] CURRY, 1960, MRN: [id number]    8/3/2023        Reason for Consult / Principal Problem:    Obstructive Sleep Apnea       Thank you for the opportunity of participating in the evaluation and care of this patient in the Sleep Clinic at [address] [place] [address]. Subjective:     HPI: Sameer Morfin is a 59y.o. year old male who presents today upon referral from his PCP for obstructive sleep apnea. Patient states he was diagnosed with sleep apnea approximately 20 years ago. He used his CPAP for a few months at that time. He found it to be quite uncomfortable and stopped using it. He states approximately 6 or 7 years ago he got a new CPAP machine and used it for approximately a few months and then quit using it because it was uncomfortable. He notes his symptoms are worsening. He states he has an increase in daytime sleepiness, nighttime awakenings and his snoring is very loud. He states he is unable to sleep in his bed due to back pain. He has been sleeping in a recliner chair in his living room for a number of years. He also admits to poor sleep hygiene. He states he will sit in the recliner and watch TV, use electronics, and eventually will drift off to sleep. He has no set bedtime. He states at times he will fall asleep in the evening watching TV and then will wake up around midnight and then be up for hours in the middle the night. He is aware that he needs to improve his sleep habits. He would like to get a new sleep study and resume CPAP treatment as soon as possible.     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 feels drowsy      Sleep Schedule:       Bedtime:  12 AM-1 AM       Latency:  Watches TV in a recliner and sleeps       Wakeup time:  6-7 AM    Awakenings:       Frequency: At least 5 times per night      Causes:  breathing issues, urination, unknown       Duration:  Moments to 10-15 minutes     Daytime Sleepiness / Inappropriate Sleep:       Most severe:  Mornings       Naps :  If home he will naps, if working can stay awake       Inappropriate drowsiness / sleep: Will sleep if watching TV     Snoring:  Loud snoring, gasping, snorting     Apnea:  Not witnessed     Change in Weight:  Gained 25 pounds since last study     Restless Leg Syndrome:  No clinical symptoms consistent with this diagnosis     Other Complaints:  No reports of sleep walking, sleep talking, sleep paralysis or hallucinations surrounding sleep. Denies waking up with headaches, bruxism, and dry mouth. Social History:      Caffeine:  20 oz of coffee per day       Tobacco:   reports that he has quit smoking. He has never used smokeless tobacco.     E-cig/Vaping:    E-Cigarette/Vaping   • E-Cigarette Use Never User       E-Cigarette/Vaping Substances   • Nicotine No    • THC No    • CBD No    • Flavoring No          Alcohol:   reports no history of alcohol use. Drugs:   reports no history of drug use. The review of systems and following portions of the patient's history were reviewed and updated as appropriate: allergies, current medications, past family history, past medical history, past social history, past surgical history and problem list.        Objective:       Vitals:    08/03/23 1411   BP: 123/83   BP Location: Left arm   Patient Position: Sitting   Cuff Size: Adult   Weight: 102 kg (225 lb)   Height: 5' 9" (1.753 m)     Body mass index is 33.23 kg/m². Neck Circumference: 18.5  [place] Sleepiness Scale:  Total score: 21      Current Outpatient Medications:   •  amLODIPine (NORVASC) 5 mg tablet, TAKE ONE TABLET BY MOUTH DAILY, Disp: 90 tablet, Rfl: 1  •  Ascorbic Acid (vitamin C) 100 MG tablet, Take 100 mg by mouth daily, Disp: , Rfl:   •  Cholecalciferol (VITAMIN D3) 3000 units TABS, Take by mouth, Disp: , Rfl:   •  hydrochlorothiazide (HYDRODIURIL) 12.5 mg tablet, TAKE ONE TABLET (12.5 MG TOTAL) BY MOUTH DAILY, Disp: 90 tablet, Rfl: 0  •  losartan (COZAAR) 100 MG tablet, TAKE ONE TABLET BY MOUTH DAILY, Disp: 90 tablet, Rfl: 0  •  venlafaxine (EFFEXOR-XR) 150 mg 24 hr capsule, TAKE ONE CAPSULE BY MOUTH DAILY, Disp: 90 capsule, Rfl: 1  •  cyanocobalamin (VITAMIN B-12) 100 MCG tablet, Take 100 mcg by mouth daily (Patient not taking: Reported on 8/3/2023), Disp: , Rfl:   •  Probiotic Product (PROBIOTIC-10 PO), Take by mouth (Patient not taking: Reported on 8/3/2023), Disp: , Rfl:     Physical Exam  General Appearance:   Alert, cooperative, no distress, appears stated age, obese     Head:   Normocephalic, without obvious abnormality, atraumatic     Eyes:   PERRL, conjunctiva/corneas clear          Nose:  Nares normal, septum midline, mucosa normal, no drainage or sinus tenderness           Throat:  Lips, teeth and gums normal; tongue normal in size and midline in position; mucosa moist, low-lying soft palate, uvula long, tonsils absent, Mallampati class 4       Neck:  Supple, symmetrical, trachea midline, no adenopathy; no thyromegaly noted, no carotid bruit or JVD     Lungs:      Clear to auscultation bilaterally, respirations unlabored     Heart:   Regular rate and rhythm, S1 and S2 normal, grade 1 murmur, rub or gallop       Extremities:  Extremities normal, atraumatic, no cyanosis or edema       Skin:  Skin color, texture, turgor normal, no rashes or lesions       Neurologic:  No focal deficits noted. ASSESSMENT / PLAN     1. Obstructive sleep apnea  Assessment & Plan:  Patient has history of obstructive sleep apnea. He has not used CPAP treatment for at least 6 to 7 years.   I ordered him a home sleep study to reconfirm a diagnosis of sleep apnea. I advised him to sleep in his recliner as this is how he has been sleeping for many years. He is unable to sleep in a bed due to back pain. Once the study is completed, I will plan to order him a new CPAP machine and follow-up with him in 31 to 90 days after he resumes treatment. We discussed the importance of treating his sleep apnea to reduce cardiovascular event risk and also to improve his daytime sleepiness. We discussed drowsy driving. I advised him not to drive if he is drowsy and if he becomes drowsy while driving he is to pull over immediately as falling asleep while driving can lead to an accident which can cause injury or death to himself or others. Patient was in agreement to this and states he always pulls over if he feels unable to continue driving. 2. LESIA (obstructive sleep apnea)  -     Ambulatory Referral to Sleep Medicine  -     Home Study; Future; Expected date: 08/03/2023         Counseling / Coordination of Care    I have spent a total time of 65 minutes on 08/03/23 in caring for this patient including Prognosis, Risks and benefits of tx options, Instructions for management, Patient and family education, Importance of tx compliance, Risk factor reductions, Impressions, Counseling / Coordination of care, Documenting in the medical record, Reviewing / ordering tests, medicine, procedures   and Obtaining or reviewing history  . The following instructions have been given to the patient today:    Patient Instructions   Please schedule your home sleep study as soon as possible. I would recommend doing the study in a recliner chair as you normally sleep. Once the study is completed, a nurse will call you and review the results. Likely this will show sleep apnea and we will order you a new CPAP machine. We would then want to see you back 31 to 90 days after you start treatment in order to review compliance with you.     Nursing Support:  When: Monday through Friday 7A-5PM except holidays  Where: Our direct line is [phone]. If you are having a true emergency please call 911. In the event that the line is busy or it is after hours please leave a voice message and we will return your call. Please speak clearly, leaving your full name, birth date, best number to reach you and the reason for your call. Medication refills: We will need the name of the medication, the dosage, the ordering provider, whether you get a 30 or 90 day refill, and the pharmacy name and address. Medications will be ordered by the provider only. Nurses cannot call in prescriptions. Please allow 7 days for medication refills. Physician requested updates: If your provider requested that you call with an update after starting medication, please be ready to provide us the medication and dosage, what time you take your medication, the time you attempt to fall asleep, time you fall asleep, when you wake up, and what time you get out of bed. Sleep Study Results: We will contact you with sleep study results and/or next steps after the physician has reviewed your testing.              Ezequiel Juarez PA-C  [address]

## 2023-08-03 NOTE — ASSESSMENT & PLAN NOTE
Patient has history of obstructive sleep apnea. He has not used CPAP treatment for at least 6 to 7 years. I ordered him a home sleep study to reconfirm a diagnosis of sleep apnea. I advised him to sleep in his recliner as this is how he has been sleeping for many years. He is unable to sleep in a bed due to back pain. Once the study is completed, I will plan to order him a new CPAP machine and follow-up with him in 31 to 90 days after he resumes treatment. We discussed the importance of treating his sleep apnea to reduce cardiovascular event risk and also to improve his daytime sleepiness. We discussed drowsy driving. I advised him not to drive if he is drowsy and if he becomes drowsy while driving he is to pull over immediately as falling asleep while driving can lead to an accident which can cause injury or death to himself or others. Patient was in agreement to this and states he always pulls over if he feels unable to continue driving.

## 2023-08-17 ENCOUNTER — OFFICE VISIT (OUTPATIENT)
Dept: FAMILY MEDICINE CLINIC | Facility: CLINIC | Age: 63
End: 2023-08-17
Payer: COMMERCIAL

## 2023-08-17 VITALS
HEART RATE: 79 BPM | OXYGEN SATURATION: 97 % | HEIGHT: 69 IN | WEIGHT: 225.4 LBS | BODY MASS INDEX: 33.38 KG/M2 | TEMPERATURE: 96 F | SYSTOLIC BLOOD PRESSURE: 146 MMHG | DIASTOLIC BLOOD PRESSURE: 90 MMHG

## 2023-08-17 DIAGNOSIS — I51.7 LVH (LEFT VENTRICULAR HYPERTROPHY): ICD-10-CM

## 2023-08-17 DIAGNOSIS — R73.03 PREDIABETES: ICD-10-CM

## 2023-08-17 DIAGNOSIS — R06.02 SHORTNESS OF BREATH: ICD-10-CM

## 2023-08-17 DIAGNOSIS — L98.9 SKIN LESIONS: ICD-10-CM

## 2023-08-17 DIAGNOSIS — R41.840 INATTENTION: ICD-10-CM

## 2023-08-17 DIAGNOSIS — I10 PRIMARY HYPERTENSION: Primary | ICD-10-CM

## 2023-08-17 PROCEDURE — 93000 ELECTROCARDIOGRAM COMPLETE: CPT | Performed by: NURSE PRACTITIONER

## 2023-08-17 PROCEDURE — 99214 OFFICE O/P EST MOD 30 MIN: CPT | Performed by: NURSE PRACTITIONER

## 2023-08-17 NOTE — PROGRESS NOTES
Name: Vaishnavi Schmid      : 1960      MRN: [id number]  Encounter Provider: MUNDO Willoughby  Encounter Date: 2023   Encounter department: [address] [place] [address]     1. Primary hypertension  -     POCT ECG    2. Prediabetes    3. Shortness of breath  -     CBC and differential; Future  -     Echo complete w/ contrast if indicated; Future; Expected date: 2023  -     POCT ECG    4. LVH (left ventricular hypertrophy)  -     Echo complete w/ contrast if indicated; Future; Expected date: 2023    5. Skin lesions  -     Ambulatory Referral to Dermatology; Future    6. Inattention    Complete lab work- this is fasting. Call to schedule echocardiogram.   EKG done today, no acute changes when compared to EKG from . Discussed differentials with patient. Reviewed previous cardiac testing with patient. ASCVD risk 17.2% but had negative coronary CT in 2023. ER for any worsening symptoms. Monitor blood pressure at home- goal <130/80- if not at goal then we will increase medication. Call with numbers or message us in the next few days. Continue to follow with sleep medicine. Call plastic surgery to re-schedule procedure for lesion removal. Follow up with dermatology. Follow up with therapy. Info given to find therapist.   Please call the office if you are experiencing any worsening of symptoms or no symptom improvement. Subjective      Here for 6 month follow up. Labs previously ordered not yet done. Progressively more short of breath over the past few months. He has noticed needing to take breaks with the steps. He only really gets shortness of breath. He never has chest pain but some discomfort in chest/neck area but it's not a pain. No radiating pain with this. Symptoms only on exertion. Has BP monitor at home but doesn't check it. Today 146/90. He typically is good with taking medications daily.      Echo in :  LEFT VENTRICLE:  Systolic function was normal by visual assessment. Ejection fraction was estimated to be 60 %. There were no regional wall motion abnormalities. Wall thickness was moderately increased. Doppler parameters were consistent with abnormal left ventricular relaxation (grade 1 diastolic dysfunction). MITRAL VALVE:  There was trace regurgitation. AORTIC VALVE:  Transaortic velocity was increased due to increased transvalvular flow. TRICUSPID VALVE:  There was mild regurgitation. PULMONIC VALVE:  There was trace regurgitation. Stress Test done in 2021  SUMMARY:  -  Stress results: There was no chest pain during stress. -  ECG conclusions: The stress ECG was equivocal for ischemia. -  Perfusion imaging: There were no perfusion defects.  -  Gated SPECT: The calculated left ventricular ejection fraction was 53 %. Left ventricular ejection fraction was within normal limits by visual estimate. There was no left ventricular regional abnormality.     IMPRESSIONS: Normal study after pharmacologic stress without reproduction of symptoms. Myocardial perfusion imaging was normal at rest and with stress. Left ventricular systolic function was normal.    Coronary calcium score 0 in February 2023. Following with sleep medicine. Has home study in September. BP higher in the AM.   Had psych eval for work and has ADD. Wants to discuss options with specialist/ counseling. Review of Systems   Constitutional: Negative for chills and fever. Eyes: Negative for discharge. Respiratory: Positive for shortness of breath. Cardiovascular: Negative for chest pain. Gastrointestinal: Negative for constipation and diarrhea. Genitourinary: Negative for difficulty urinating. Musculoskeletal: Negative for joint swelling. Skin: Negative for rash. Neurological: Negative for headaches. Hematological: Negative for adenopathy. Psychiatric/Behavioral: The patient is not nervous/anxious.         Current Outpatient Medications on File Prior to Visit   Medication Sig   • amLODIPine (NORVASC) 5 mg tablet TAKE ONE TABLET BY MOUTH DAILY   • Cholecalciferol (VITAMIN D3) 3000 units TABS Take by mouth   • hydrochlorothiazide (HYDRODIURIL) 12.5 mg tablet TAKE ONE TABLET (12.5 MG TOTAL) BY MOUTH DAILY   • losartan (COZAAR) 100 MG tablet TAKE ONE TABLET BY MOUTH DAILY   • venlafaxine (EFFEXOR-XR) 150 mg 24 hr capsule TAKE ONE CAPSULE BY MOUTH DAILY       Objective     /90 (BP Location: Left arm, Patient Position: Sitting, Cuff Size: Standard)   Pulse 79   Temp (!) 96 °F (35.6 °C) (Temporal)   Ht 5' 9" (1.753 m)   Wt 102 kg (225 lb 6.4 oz)   SpO2 97%   BMI 33.29 kg/m²     Physical Exam  Vitals and nursing note reviewed. Constitutional:       General: He is not in acute distress. Appearance: He is well-developed. He is obese. He is not diaphoretic. HENT:      Head: Normocephalic and atraumatic. Right Ear: External ear normal.      Left Ear: External ear normal.   Eyes:      General: Lids are normal.         Right eye: No discharge. Left eye: No discharge. Conjunctiva/sclera: Conjunctivae normal.   Cardiovascular:      Rate and Rhythm: Normal rate and regular rhythm. Heart sounds: Murmur heard. Systolic murmur is present with a grade of 3/6. Pulmonary:      Effort: Pulmonary effort is normal. No respiratory distress. Breath sounds: Normal breath sounds. No wheezing. Musculoskeletal:         General: No deformity. Cervical back: Neck supple. Skin:     General: Skin is warm and dry. Comments: 2 skin lesions on scalp, slightly raised, skin discoloration noted   Neurological:      Mental Status: He is alert and oriented to person, place, and time. Psychiatric:         Speech: Speech normal.         Behavior: Behavior normal.         Thought Content:  Thought content normal.         Judgment: Judgment normal.       Trudie Landau, CRNP

## 2023-08-17 NOTE — PATIENT INSTRUCTIONS
Complete lab work- this is fasting. Call to schedule echocardiogram.     EKG done today. ER for any worsening symptoms. Monitor blood pressure at home- goal <130/80- if not at goal then we will increase medication. Continue to follow with sleep medicine. Call plastic surgery to re-schedule procedure for lesion removal.     Follow up with dermatology. Follow up with therapy. Please call the office if you are experiencing any worsening of symptoms or no symptom improvement. How to find a therapist:  You can call the back of your insurance card for covered providers or check on your insurance's website. Or you can visit [email or web address] to find a covered therapist. Lilliana Knutson can filter by your insurance type, location, and other preferences. This will give you a list with pictures and bios about the therapists so you can appropriately choose one you feel will be the most helpful for your goals.

## 2023-08-23 DIAGNOSIS — I10 ESSENTIAL HYPERTENSION: ICD-10-CM

## 2023-08-23 RX ORDER — HYDROCHLOROTHIAZIDE 12.5 MG/1
TABLET ORAL
Qty: 90 TABLET | Refills: 0 | Status: SHIPPED | OUTPATIENT
Start: 2023-08-23

## 2023-08-30 ENCOUNTER — APPOINTMENT (OUTPATIENT)
Dept: LAB | Facility: MEDICAL CENTER | Age: 63
End: 2023-08-30
Payer: COMMERCIAL

## 2023-08-30 DIAGNOSIS — R06.02 SHORTNESS OF BREATH: ICD-10-CM

## 2023-08-30 DIAGNOSIS — R73.03 PREDIABETES: ICD-10-CM

## 2023-08-30 DIAGNOSIS — Z12.5 PROSTATE CANCER SCREENING: ICD-10-CM

## 2023-08-30 DIAGNOSIS — E78.00 PURE HYPERCHOLESTEROLEMIA: ICD-10-CM

## 2023-08-30 LAB
ALBUMIN SERPL BCP-MCNC: 4.4 G/DL (ref 3.5–5)
ALP SERPL-CCNC: 82 U/L (ref 34–104)
ALT SERPL W P-5'-P-CCNC: 45 U/L (ref 7–52)
ANION GAP SERPL CALCULATED.3IONS-SCNC: 11 MMOL/L
AST SERPL W P-5'-P-CCNC: 36 U/L (ref 13–39)
BASOPHILS # BLD AUTO: 0.11 THOUSANDS/ÂΜL (ref 0–0.1)
BASOPHILS NFR BLD AUTO: 1 % (ref 0–1)
BILIRUB SERPL-MCNC: 1.06 MG/DL (ref 0.2–1)
BUN SERPL-MCNC: 16 MG/DL (ref 5–25)
CALCIUM SERPL-MCNC: 9.6 MG/DL (ref 8.4–10.2)
CHLORIDE SERPL-SCNC: 101 MMOL/L (ref 96–108)
CHOLEST SERPL-MCNC: 234 MG/DL
CO2 SERPL-SCNC: 26 MMOL/L (ref 21–32)
CREAT SERPL-MCNC: 0.91 MG/DL (ref 0.6–1.3)
EOSINOPHIL # BLD AUTO: 0.4 THOUSAND/ÂΜL (ref 0–0.61)
EOSINOPHIL NFR BLD AUTO: 4 % (ref 0–6)
ERYTHROCYTE [DISTWIDTH] IN BLOOD BY AUTOMATED COUNT: 13.3 % (ref 11.6–15.1)
EST. AVERAGE GLUCOSE BLD GHB EST-MCNC: 137 MG/DL
GFR SERPL CREATININE-BSD FRML MDRD: 90 ML/MIN/1.73SQ M
GLUCOSE P FAST SERPL-MCNC: 119 MG/DL (ref 65–99)
HBA1C MFR BLD: 6.4 %
HCT VFR BLD AUTO: 44.5 % (ref 36.5–49.3)
HDLC SERPL-MCNC: 43 MG/DL
HGB BLD-MCNC: 15.8 G/DL (ref 12–17)
IMM GRANULOCYTES # BLD AUTO: 0.03 THOUSAND/UL (ref 0–0.2)
IMM GRANULOCYTES NFR BLD AUTO: 0 % (ref 0–2)
LDLC SERPL CALC-MCNC: 156 MG/DL (ref 0–100)
LYMPHOCYTES # BLD AUTO: 4.13 THOUSANDS/ÂΜL (ref 0.6–4.47)
LYMPHOCYTES NFR BLD AUTO: 36 % (ref 14–44)
MCH RBC QN AUTO: 30.5 PG (ref 26.8–34.3)
MCHC RBC AUTO-ENTMCNC: 35.5 G/DL (ref 31.4–37.4)
MCV RBC AUTO: 86 FL (ref 82–98)
MONOCYTES # BLD AUTO: 1.05 THOUSAND/ÂΜL (ref 0.17–1.22)
MONOCYTES NFR BLD AUTO: 9 % (ref 4–12)
NEUTROPHILS # BLD AUTO: 5.63 THOUSANDS/ÂΜL (ref 1.85–7.62)
NEUTS SEG NFR BLD AUTO: 50 % (ref 43–75)
NONHDLC SERPL-MCNC: 191 MG/DL
NRBC BLD AUTO-RTO: 0 /100 WBCS
PLATELET # BLD AUTO: 268 THOUSANDS/UL (ref 149–390)
PMV BLD AUTO: 11.9 FL (ref 8.9–12.7)
POTASSIUM SERPL-SCNC: 3.8 MMOL/L (ref 3.5–5.3)
PROT SERPL-MCNC: 7.5 G/DL (ref 6.4–8.4)
PSA SERPL-MCNC: 0.24 NG/ML (ref 0–4)
RBC # BLD AUTO: 5.18 MILLION/UL (ref 3.88–5.62)
SODIUM SERPL-SCNC: 138 MMOL/L (ref 135–147)
TRIGL SERPL-MCNC: 177 MG/DL
WBC # BLD AUTO: 11.35 THOUSAND/UL (ref 4.31–10.16)

## 2023-08-30 PROCEDURE — 80053 COMPREHEN METABOLIC PANEL: CPT

## 2023-08-30 PROCEDURE — 85025 COMPLETE CBC W/AUTO DIFF WBC: CPT

## 2023-08-30 PROCEDURE — 36415 COLL VENOUS BLD VENIPUNCTURE: CPT

## 2023-08-30 PROCEDURE — 83036 HEMOGLOBIN GLYCOSYLATED A1C: CPT

## 2023-08-30 PROCEDURE — G0103 PSA SCREENING: HCPCS

## 2023-08-30 PROCEDURE — 80061 LIPID PANEL: CPT

## 2023-09-06 ENCOUNTER — HOSPITAL ENCOUNTER (OUTPATIENT)
Dept: SLEEP CENTER | Facility: CLINIC | Age: 63
Discharge: HOME | End: 2023-09-06
Payer: COMMERCIAL

## 2023-09-06 DIAGNOSIS — G47.33 OSA (OBSTRUCTIVE SLEEP APNEA): ICD-10-CM

## 2023-09-06 PROCEDURE — G0399 HOME SLEEP TEST/TYPE 3 PORTA: HCPCS

## 2023-09-08 NOTE — PROGRESS NOTES
Home Sleep Study Documentation    HOME STUDY DEVICE: Noxturnal no                                           Cindy G3 yes      Pre-Sleep Home Study:    Set-up and instructions performed by: Marylee Bamberg performed demonstration for Patient: yes    Return demonstration performed by Patient: yes    Written instructions provided to Patient: yes    Patient signed consent form: yes        Post-Sleep Home Study:    Additional comments by Patient: none    Home Sleep Study Failed:no:    Failure reason: N/A    Reported or Detected: N/A    Scored by:  LUCI Moseley

## 2023-09-10 PROCEDURE — 95806 SLEEP STUDY UNATT&RESP EFFT: CPT | Performed by: PSYCHIATRY & NEUROLOGY

## 2023-09-12 ENCOUNTER — TELEPHONE (OUTPATIENT)
Dept: SLEEP CENTER | Facility: CLINIC | Age: 63
End: 2023-09-12

## 2023-09-12 DIAGNOSIS — G47.33 OSA (OBSTRUCTIVE SLEEP APNEA): Primary | ICD-10-CM

## 2023-09-12 LAB
DME PARACHUTE DELIVERY DATE REQUESTED: NORMAL
DME PARACHUTE DELIVERY NOTE: NORMAL
DME PARACHUTE ITEM DESCRIPTION: NORMAL
DME PARACHUTE ORDER STATUS: NORMAL
DME PARACHUTE SUPPLIER NAME: NORMAL
DME PARACHUTE SUPPLIER PHONE: NORMAL

## 2023-09-12 NOTE — TELEPHONE ENCOUNTER
Called patient and advised of sleep study results. Scheduled DME set up in [facility] 9/27/23. Rx for CPAP and clinical information sent to [address] [place] [address] via [place]. Compliance follow up is already scheduled 1/4/24.   Added to wait list.

## 2023-09-12 NOTE — TELEPHONE ENCOUNTER
----- Message from Zen Hickman PA-C sent at 9/12/2023  8:24 AM EDT -----  Severe LESIA reconfirmed, CPAP recommended and ordered. Please schedule set up as soon as possible and follow up in 31-90 days.

## 2023-09-16 DIAGNOSIS — I10 ESSENTIAL HYPERTENSION: ICD-10-CM

## 2023-09-18 RX ORDER — AMLODIPINE BESYLATE 5 MG/1
TABLET ORAL
Qty: 90 TABLET | Refills: 1 | Status: SHIPPED | OUTPATIENT
Start: 2023-09-18

## 2023-09-27 LAB
DME PARACHUTE DELIVERY DATE ACTUAL: NORMAL
DME PARACHUTE DELIVERY DATE EXPECTED: NORMAL
DME PARACHUTE DELIVERY DATE REQUESTED: NORMAL
DME PARACHUTE DELIVERY NOTE: NORMAL
DME PARACHUTE ITEM DESCRIPTION: NORMAL
DME PARACHUTE ORDER STATUS: NORMAL
DME PARACHUTE SUPPLIER NAME: NORMAL
DME PARACHUTE SUPPLIER PHONE: NORMAL

## 2023-11-16 DIAGNOSIS — I10 ESSENTIAL HYPERTENSION: ICD-10-CM

## 2023-11-16 RX ORDER — HYDROCHLOROTHIAZIDE 12.5 MG/1
TABLET ORAL
Qty: 90 TABLET | Refills: 0 | Status: SHIPPED | OUTPATIENT
Start: 2023-11-16

## 2023-12-28 ENCOUNTER — OFFICE VISIT (OUTPATIENT)
Dept: URGENT CARE | Facility: MEDICAL CENTER | Age: 63
End: 2023-12-28
Payer: COMMERCIAL

## 2023-12-28 VITALS
TEMPERATURE: 97.2 F | DIASTOLIC BLOOD PRESSURE: 71 MMHG | BODY MASS INDEX: 32.28 KG/M2 | WEIGHT: 213 LBS | SYSTOLIC BLOOD PRESSURE: 148 MMHG | HEIGHT: 68 IN | OXYGEN SATURATION: 97 % | HEART RATE: 98 BPM | RESPIRATION RATE: 18 BRPM

## 2023-12-28 DIAGNOSIS — U07.1 COVID: Primary | ICD-10-CM

## 2023-12-28 LAB
SARS-COV-2 AG UPPER RESP QL IA: POSITIVE
VALID CONTROL: ABNORMAL

## 2023-12-28 PROCEDURE — 87811 SARS-COV-2 COVID19 W/OPTIC: CPT | Performed by: PHYSICIAN ASSISTANT

## 2023-12-28 PROCEDURE — G0382 LEV 3 HOSP TYPE B ED VISIT: HCPCS | Performed by: PHYSICIAN ASSISTANT

## 2023-12-28 RX ORDER — NIRMATRELVIR AND RITONAVIR 300-100 MG
3 KIT ORAL 2 TIMES DAILY
Qty: 30 TABLET | Refills: 0 | Status: SHIPPED | OUTPATIENT
Start: 2023-12-28 | End: 2024-01-02

## 2023-12-28 NOTE — PROGRESS NOTES
"[facility]        NAME: Arturo Baez is a 63 y.o. male  : 1960    MRN: [id number]  DATE: 2023  TIME: 1:15 PM    /71   Pulse 98   Temp (!) 97.2 °F (36.2 °C) (Temporal)   Resp 18   Ht 5' 8\" (1.727 m)   Wt 96.6 kg (213 lb)   SpO2 97%   BMI 32.39 kg/m²     Assessment and Plan   COVID [U07.1]  1. COVID  Poct Covid 19 Rapid Antigen Test    nirmatrelvir & ritonavir (Paxlovid, 300/100,) tablet therapy pack            Patient Instructions       Follow up with PCP in 3-5 days.  Proceed to  ER if symptoms worsen.    Chief Complaint     Chief Complaint   Patient presents with    Cold Like Symptoms     Started Tuesday with scratchy throat, sore throat, congestion, lightheaded, fever, headache and body aches.  Has been taking aleve.         History of Present Illness       Pt with 2 days nasal congestion sore throat body aches frontal headache some dizziness non now        Review of Systems   Review of Systems   Constitutional:  Positive for fatigue and fever.   HENT:  Positive for congestion and sore throat.    Eyes: Negative.    Respiratory:  Positive for cough.    Cardiovascular: Negative.    Gastrointestinal: Negative.    Endocrine: Negative for cold intolerance.   Genitourinary: Negative.    Musculoskeletal: Negative.    Skin: Negative.    Allergic/Immunologic: Negative.    Neurological: Negative.    Hematological: Negative.    Psychiatric/Behavioral: Negative.     All other systems reviewed and are negative.        Current Medications       Current Outpatient Medications:     amLODIPine (NORVASC) 5 mg tablet, TAKE ONE TABLET BY MOUTH DAILY, Disp: 90 tablet, Rfl: 1    Cholecalciferol (VITAMIN D3) 3000 units TABS, Take by mouth, Disp: , Rfl:     hydrochlorothiazide (HYDRODIURIL) 12.5 mg tablet, TAKE ONE TABLET (12.5 MG TOTAL) BY MOUTH DAILY, Disp: 90 tablet, Rfl: 0    losartan (COZAAR) 100 MG tablet, TAKE ONE TABLET BY MOUTH DAILY, Disp: 90 tablet, Rfl: 0    nirmatrelvir & ritonavir " "(Paxlovid, 300/100,) tablet therapy pack, Take 3 tablets by mouth 2 (two) times a day for 5 days Take 2 nirmatrelvir tablets + 1 ritonavir tablet together per dose, Disp: 30 tablet, Rfl: 0    venlafaxine (EFFEXOR-XR) 150 mg 24 hr capsule, TAKE ONE CAPSULE BY MOUTH DAILY, Disp: 90 capsule, Rfl: 1    Current Allergies     Allergies as of 12/28/2023 - Reviewed 12/28/2023   Allergen Reaction Noted    Lisinopril  10/09/2012    Prochlorperazine Myalgia 10/09/2012    Quetiapine  10/09/2012            The following portions of the patient's history were reviewed and updated as appropriate: allergies, current medications, past family history, past medical history, past social history, past surgical history and problem list.     Past Medical History:   Diagnosis Date    Alcohol abuse     Uncomplicated    Colon cancer (HCC)     Last Assessed: 2/13/2013    Hearing loss     Hereditary spherocytosis (HCC)     Thinking about Suicide (HCC)        Past Surgical History:   Procedure Laterality Date    CHOLECYSTECTOMY  1978    SPLENECTOMY  1978    due to spherocytosis       Family History   Problem Relation Age of Onset    Colon cancer Mother     Hypertension Mother         Essential    Cancer Father         Gastric         Medications have been verified.        Objective   /71   Pulse 98   Temp (!) 97.2 °F (36.2 °C) (Temporal)   Resp 18   Ht 5' 8\" (1.727 m)   Wt 96.6 kg (213 lb)   SpO2 97%   BMI 32.39 kg/m²        Physical Exam     Physical Exam  Vitals reviewed.   Constitutional:       Appearance: Normal appearance. He is normal weight.   HENT:      Head: Normocephalic and atraumatic.      Right Ear: Tympanic membrane, ear canal and external ear normal.      Left Ear: Tympanic membrane, ear canal and external ear normal.      Nose: Congestion and rhinorrhea present.      Mouth/Throat:      Mouth: Mucous membranes are moist.      Pharynx: Oropharynx is clear. Posterior oropharyngeal erythema present.   Eyes:      " Extraocular Movements: Extraocular movements intact.      Conjunctiva/sclera: Conjunctivae normal.      Pupils: Pupils are equal, round, and reactive to light.   Cardiovascular:      Rate and Rhythm: Normal rate and regular rhythm.      Pulses: Normal pulses.      Comments: 2/6 yifan  history of sai e  Pulmonary:      Effort: Pulmonary effort is normal.      Breath sounds: Normal breath sounds.   Abdominal:      General: Abdomen is flat. Bowel sounds are normal.      Palpations: Abdomen is soft.   Musculoskeletal:         General: Normal range of motion.      Cervical back: Normal range of motion and neck supple.   Skin:     General: Skin is warm.   Neurological:      Mental Status: He is alert and oriented to person, place, and time.

## 2024-01-04 ENCOUNTER — OFFICE VISIT (OUTPATIENT)
Dept: SLEEP CENTER | Facility: CLINIC | Age: 64
End: 2024-01-04
Payer: COMMERCIAL

## 2024-01-04 VITALS
HEIGHT: 68 IN | BODY MASS INDEX: 31.98 KG/M2 | WEIGHT: 211 LBS | HEART RATE: 66 BPM | SYSTOLIC BLOOD PRESSURE: 128 MMHG | DIASTOLIC BLOOD PRESSURE: 77 MMHG

## 2024-01-04 DIAGNOSIS — G47.33 OSA (OBSTRUCTIVE SLEEP APNEA): Primary | ICD-10-CM

## 2024-01-04 PROCEDURE — 99213 OFFICE O/P EST LOW 20 MIN: CPT | Performed by: PHYSICIAN ASSISTANT

## 2024-01-04 NOTE — ASSESSMENT & PLAN NOTE
Patient was set up with his new CPAP in September after completing a home sleep study showing severe obstructive sleep apnea with an LUISANA of 65.  He discontinued usage due to mask issues.  I placed an order today for new supplies and a mask fitting.  I encouraged him to find a mask that he feels happy and comfortable with as his sleep apnea was severe in nature.  He is aware that untreated sleep apnea can increase his cardiovascular event risk.  Patient feels he would do better with a full facemask.  Advised him to return in the next 3 to 4 months to review compliance and effectiveness of treatment.

## 2024-01-04 NOTE — PROGRESS NOTES
"        Progress Note - [facility] [place]   Arturo Baez 63 y.o. male   :1960, MRN: [id number]  2024          Follow Up Evaluation / Problem:     Severe Obstructive Sleep Apnea      Thank you for the opportunity of participating in the evaluation and care of this patient in the Sleep Clinic at [facility].      HPI: Arturo Baez is a 63 y.o. year old male.  The patient presents for follow up of severe obstructive sleep apnea. Patient states he was diagnosed with sleep apnea approximately 20 years ago.  He used his CPAP for a few months at that time.  He found it to be quite uncomfortable and stopped using it. He states approximately 6 or 7 years ago he got a new CPAP machine and used it for approximately a few months and then quit using it because it was uncomfortable.  He noted his symptoms are worsening.  He stated he has an increase in daytime sleepiness, nighttime awakenings and his snoring is very loud.   He then completed a home sleep study on 23, which showed severe obstructive sleep apnea with a LUISANA of 65.1.  Auto CPAP was ordered. He presents to review compliance and effectiveness of treatment.        Current Outpatient Medications:     amLODIPine (NORVASC) 5 mg tablet, TAKE ONE TABLET BY MOUTH DAILY, Disp: 90 tablet, Rfl: 1    Cholecalciferol (VITAMIN D3) 3000 units TABS, Take by mouth, Disp: , Rfl:     hydrochlorothiazide (HYDRODIURIL) 12.5 mg tablet, TAKE ONE TABLET (12.5 MG TOTAL) BY MOUTH DAILY, Disp: 90 tablet, Rfl: 0    losartan (COZAAR) 100 MG tablet, TAKE ONE TABLET BY MOUTH DAILY, Disp: 90 tablet, Rfl: 0    venlafaxine (EFFEXOR-XR) 150 mg 24 hr capsule, TAKE ONE CAPSULE BY MOUTH DAILY, Disp: 90 capsule, Rfl: 1                   Vitals:    24 1513   BP: 128/77   BP Location: Left arm   Patient Position: Sitting   Cuff Size: Large   Pulse: 66   Weight: 95.7 kg (211 lb)   Height: 5' 8\" (1.727 m)       Body mass index is 32.08 kg/m².     "        EPWORTH SLEEPINESS SCORE         Past History Since Last Sleep Center Visit:     Patient states when he first got his CPAP in September he used it for a few weeks.  He felt after using it he was sleeping better, more rested, and alert.  He states he was having difficulty using the hybrid style mask as his left nostril continue to clog.  He then stopped using his CPAP.  He would like to resume usage and use a standard fullface mask.    The patient reports that he cleans the equipment appropriately and changes supplies on a regular basis.    The review of systems and following portions of the patient's history were reviewed and updated as appropriate: allergies, current medications, past family history, past medical history, past social history, past surgical history, and problem list.        OBJECTIVE  Equipment set up date:  9/27/23  PAP Pressure: Nasal AutoPAP using a lower limit of 5 cm and an upper limit of 15 cm of water pressure  Type of mask used: full face  DME Provider: [facility]  Denies aerophagia or AM headaches    Physical Exam:     General Appearance:   Alert, cooperative, no distress, appears stated age, obese       ASSESSMENT / PLAN    1. LESIA (obstructive sleep apnea)  Assessment & Plan:  Patient was set up with his new CPAP in September after completing a home sleep study showing severe obstructive sleep apnea with an LUISANA of 65.  He discontinued usage due to mask issues.  I placed an order today for new supplies and a mask fitting.  I encouraged him to find a mask that he feels happy and comfortable with as his sleep apnea was severe in nature.  He is aware that untreated sleep apnea can increase his cardiovascular event risk.  Patient feels he would do better with a full facemask.  Advised him to return in the next 3 to 4 months to review compliance and effectiveness of treatment.    Orders:  -     Mask fitting only; Future  -     PAP DME Resupply/Reorder             Counseling /  Coordination of Care  I have spent a total time of 22 minutes on 01/04/24 in caring for this patient including Diagnostic results, Prognosis, Risks and benefits of tx options, Instructions for management, Patient and family education, Importance of tx compliance, Risk factor reductions, Impressions, Counseling / Coordination of care, Documenting in the medical record, Reviewing / ordering tests, medicine, procedures  , and Obtaining or reviewing history  .      Today I reviewed the patient's compliance data.  he has been able to use the equipment 7% of all days recorded.  Average usage was 4 or more hours 3% of all days recorded.  The patient uses the equipment for an average of 0 hours and 16 minutes per night.  The estimated AHI is 28.7 abnormal breathing events per hour.  The patient feels they benefit from the use of PAP equipment and would like to continue PAP therapy.  Response to treatment has been good.  A prescription for supplies has been provided to last for the next year.    He will continue using this equipment at the settings noted above for the next 4 months.  At that timehe will then return for a routine follow-up evaluation. I have asked the patient to contact the Sleep Disorders Center if he encounters any difficulties prior to that time.    The following instructions have been given to the patient today:    Patient Instructions   I placed an order today for a mask fitting as well as an order for a new mask and supplies.  I would recommend calling [facility] and scheduling a mask fitting.  When you are able to find a mask that you are comfortable and happy with, I think it will make it easier for you to use your CPAP nightly.  As your sleep apnea was severe in nature, I would recommend using it every night.    Nursing Support:  When: Monday through Friday 7A-5PM except holidays  Where: Our direct line is [phone].    If you are having a true emergency please call 911.  In the event that the  line is busy or it is after hours please leave a voice message and we will return your call.  Please speak clearly, leaving your full name, birth date, best number to reach you and the reason for your call.   Medication refills: We will need the name of the medication, the dosage, the ordering provider, whether you get a 30 or 90 day refill, and the pharmacy name and address.  Medications will be ordered by the provider only.  Nurses cannot call in prescriptions.  Please allow 7 days for medication refills.  Physician requested updates: If your provider requested that you call with an update after starting medication, please be ready to provide us the medication and dosage, what time you take your medication, the time you attempt to fall asleep, time you fall asleep, when you wake up, and what time you get out of bed.  Sleep Study Results: We will contact you with sleep study results and/or next steps after the physician has reviewed your testing.         Yue Snachez PA-C  [facility]

## 2024-01-04 NOTE — PATIENT INSTRUCTIONS
I placed an order today for a mask fitting as well as an order for a new mask and supplies.  I would recommend calling [facility] and scheduling a mask fitting.  When you are able to find a mask that you are comfortable and happy with, I think it will make it easier for you to use your CPAP nightly.  As your sleep apnea was severe in nature, I would recommend using it every night.    Nursing Support:  When: Monday through Friday 7A-5PM except holidays  Where: Our direct line is [phone].    If you are having a true emergency please call 911.  In the event that the line is busy or it is after hours please leave a voice message and we will return your call.  Please speak clearly, leaving your full name, birth date, best number to reach you and the reason for your call.   Medication refills: We will need the name of the medication, the dosage, the ordering provider, whether you get a 30 or 90 day refill, and the pharmacy name and address.  Medications will be ordered by the provider only.  Nurses cannot call in prescriptions.  Please allow 7 days for medication refills.  Physician requested updates: If your provider requested that you call with an update after starting medication, please be ready to provide us the medication and dosage, what time you take your medication, the time you attempt to fall asleep, time you fall asleep, when you wake up, and what time you get out of bed.  Sleep Study Results: We will contact you with sleep study results and/or next steps after the physician has reviewed your testing.

## 2024-01-08 ENCOUNTER — TELEPHONE (OUTPATIENT)
Dept: SLEEP CENTER | Facility: CLINIC | Age: 64
End: 2024-01-08

## 2024-01-12 LAB

## 2024-02-01 ENCOUNTER — TELEPHONE (OUTPATIENT)
Age: 64
End: 2024-02-01

## 2024-02-01 NOTE — TELEPHONE ENCOUNTER
Patient calling in to verify if any lab work was order prior to his schedule office visit, instructed patient on ordered labs and that they were fasting, patient verbalized understanding

## 2024-02-02 ENCOUNTER — APPOINTMENT (OUTPATIENT)
Dept: LAB | Facility: CLINIC | Age: 64
End: 2024-02-02
Payer: COMMERCIAL

## 2024-02-02 DIAGNOSIS — R73.03 PREDIABETES: ICD-10-CM

## 2024-02-02 DIAGNOSIS — E78.5 HYPERLIPIDEMIA, UNSPECIFIED HYPERLIPIDEMIA TYPE: ICD-10-CM

## 2024-02-02 DIAGNOSIS — I10 PRIMARY HYPERTENSION: ICD-10-CM

## 2024-02-02 LAB
ALBUMIN SERPL BCP-MCNC: 4.5 G/DL (ref 3.5–5)
ALP SERPL-CCNC: 73 U/L (ref 34–104)
ALT SERPL W P-5'-P-CCNC: 27 U/L (ref 7–52)
ANION GAP SERPL CALCULATED.3IONS-SCNC: 12 MMOL/L
AST SERPL W P-5'-P-CCNC: 24 U/L (ref 13–39)
BASOPHILS # BLD AUTO: 0.1 THOUSANDS/ÂΜL (ref 0–0.1)
BASOPHILS NFR BLD AUTO: 1 % (ref 0–1)
BILIRUB SERPL-MCNC: 0.89 MG/DL (ref 0.2–1)
BUN SERPL-MCNC: 14 MG/DL (ref 5–25)
CALCIUM ALBUM COR SERPL-MCNC: 9.3 MG/DL (ref 8.3–10.1)
CALCIUM SERPL-MCNC: 9.7 MG/DL (ref 8.4–10.2)
CHLORIDE SERPL-SCNC: 104 MMOL/L (ref 96–108)
CHOLEST SERPL-MCNC: 231 MG/DL
CO2 SERPL-SCNC: 26 MMOL/L (ref 21–32)
CREAT SERPL-MCNC: 1.07 MG/DL (ref 0.6–1.3)
EOSINOPHIL # BLD AUTO: 0.32 THOUSAND/ÂΜL (ref 0–0.61)
EOSINOPHIL NFR BLD AUTO: 4 % (ref 0–6)
ERYTHROCYTE [DISTWIDTH] IN BLOOD BY AUTOMATED COUNT: 13.7 % (ref 11.6–15.1)
EST. AVERAGE GLUCOSE BLD GHB EST-MCNC: 123 MG/DL
GFR SERPL CREATININE-BSD FRML MDRD: 73 ML/MIN/1.73SQ M
GLUCOSE P FAST SERPL-MCNC: 115 MG/DL (ref 65–99)
HBA1C MFR BLD: 5.9 %
HCT VFR BLD AUTO: 44.8 % (ref 36.5–49.3)
HDLC SERPL-MCNC: 48 MG/DL
HGB BLD-MCNC: 15.6 G/DL (ref 12–17)
IMM GRANULOCYTES # BLD AUTO: 0.01 THOUSAND/UL (ref 0–0.2)
IMM GRANULOCYTES NFR BLD AUTO: 0 % (ref 0–2)
LDLC SERPL CALC-MCNC: 155 MG/DL (ref 0–100)
LYMPHOCYTES # BLD AUTO: 3.9 THOUSANDS/ÂΜL (ref 0.6–4.47)
LYMPHOCYTES NFR BLD AUTO: 42 % (ref 14–44)
MCH RBC QN AUTO: 30.2 PG (ref 26.8–34.3)
MCHC RBC AUTO-ENTMCNC: 34.8 G/DL (ref 31.4–37.4)
MCV RBC AUTO: 87 FL (ref 82–98)
MONOCYTES # BLD AUTO: 0.85 THOUSAND/ÂΜL (ref 0.17–1.22)
MONOCYTES NFR BLD AUTO: 9 % (ref 4–12)
NEUTROPHILS # BLD AUTO: 4.01 THOUSANDS/ÂΜL (ref 1.85–7.62)
NEUTS SEG NFR BLD AUTO: 44 % (ref 43–75)
NONHDLC SERPL-MCNC: 183 MG/DL
NRBC BLD AUTO-RTO: 0 /100 WBCS
PLATELET # BLD AUTO: 301 THOUSANDS/UL (ref 149–390)
PMV BLD AUTO: 13.2 FL (ref 8.9–12.7)
POTASSIUM SERPL-SCNC: 4.2 MMOL/L (ref 3.5–5.3)
PROT SERPL-MCNC: 7.7 G/DL (ref 6.4–8.4)
RBC # BLD AUTO: 5.17 MILLION/UL (ref 3.88–5.62)
SODIUM SERPL-SCNC: 142 MMOL/L (ref 135–147)
TRIGL SERPL-MCNC: 138 MG/DL
TSH SERPL DL<=0.05 MIU/L-ACNC: 3.25 UIU/ML (ref 0.45–4.5)
WBC # BLD AUTO: 9.19 THOUSAND/UL (ref 4.31–10.16)

## 2024-02-02 PROCEDURE — 80053 COMPREHEN METABOLIC PANEL: CPT

## 2024-02-02 PROCEDURE — 84443 ASSAY THYROID STIM HORMONE: CPT

## 2024-02-02 PROCEDURE — 83036 HEMOGLOBIN GLYCOSYLATED A1C: CPT

## 2024-02-02 PROCEDURE — 80061 LIPID PANEL: CPT

## 2024-02-02 PROCEDURE — 85025 COMPLETE CBC W/AUTO DIFF WBC: CPT

## 2024-02-02 PROCEDURE — 36415 COLL VENOUS BLD VENIPUNCTURE: CPT

## 2024-02-03 DIAGNOSIS — Z00.6 ENCOUNTER FOR EXAMINATION FOR NORMAL COMPARISON OR CONTROL IN CLINICAL RESEARCH PROGRAM: ICD-10-CM

## 2024-02-05 ENCOUNTER — OFFICE VISIT (OUTPATIENT)
Dept: FAMILY MEDICINE CLINIC | Facility: CLINIC | Age: 64
End: 2024-02-05
Payer: COMMERCIAL

## 2024-02-05 VITALS
RESPIRATION RATE: 16 BRPM | TEMPERATURE: 96.4 F | HEIGHT: 70 IN | DIASTOLIC BLOOD PRESSURE: 100 MMHG | OXYGEN SATURATION: 98 % | BODY MASS INDEX: 29.92 KG/M2 | HEART RATE: 66 BPM | WEIGHT: 209 LBS | SYSTOLIC BLOOD PRESSURE: 138 MMHG

## 2024-02-05 DIAGNOSIS — E55.9 VITAMIN D DEFICIENCY: ICD-10-CM

## 2024-02-05 DIAGNOSIS — Z12.5 PROSTATE CANCER SCREENING: ICD-10-CM

## 2024-02-05 DIAGNOSIS — N40.0 BENIGN PROSTATIC HYPERPLASIA, UNSPECIFIED WHETHER LOWER URINARY TRACT SYMPTOMS PRESENT: ICD-10-CM

## 2024-02-05 DIAGNOSIS — Z23 ENCOUNTER FOR IMMUNIZATION: ICD-10-CM

## 2024-02-05 DIAGNOSIS — L98.9 SKIN LESION: ICD-10-CM

## 2024-02-05 DIAGNOSIS — F32.A DEPRESSION, UNSPECIFIED DEPRESSION TYPE: ICD-10-CM

## 2024-02-05 DIAGNOSIS — R73.03 PREDIABETES: ICD-10-CM

## 2024-02-05 DIAGNOSIS — I10 PRIMARY HYPERTENSION: ICD-10-CM

## 2024-02-05 DIAGNOSIS — E53.8 VITAMIN B12 DEFICIENCY: ICD-10-CM

## 2024-02-05 DIAGNOSIS — Z00.00 HEALTH MAINTENANCE EXAMINATION: Primary | ICD-10-CM

## 2024-02-05 DIAGNOSIS — E78.5 HYPERLIPIDEMIA, UNSPECIFIED HYPERLIPIDEMIA TYPE: ICD-10-CM

## 2024-02-05 DIAGNOSIS — G47.33 OSA (OBSTRUCTIVE SLEEP APNEA): ICD-10-CM

## 2024-02-05 DIAGNOSIS — Z87.891 HISTORY OF TOBACCO USE: ICD-10-CM

## 2024-02-05 PROCEDURE — 99214 OFFICE O/P EST MOD 30 MIN: CPT | Performed by: NURSE PRACTITIONER

## 2024-02-05 PROCEDURE — 99396 PREV VISIT EST AGE 40-64: CPT | Performed by: NURSE PRACTITIONER

## 2024-02-05 NOTE — PATIENT INSTRUCTIONS
Complete vitamin D and B12 now.     Other labs due in 6 months- these are fasting.     Work on low cholesterol diet/ exercise.     Monitor blood pressure- goal <130/80.     Stay well hydrated.     Continue to follow with dermatology and sleep medicine.     Please call the office if you are experiencing any worsening of symptoms or no symptom improvement.

## 2024-02-05 NOTE — ASSESSMENT & PLAN NOTE
Not monitoring at home. Encouraged to monitor. Not at goal today. Continue with norvasc 5 mg and HCTZ 12.5 mg and Losartan 100 mg

## 2024-02-05 NOTE — PROGRESS NOTES
ADULT ANNUAL PHYSICAL  [facility]IMARY CARE    NAME: Arturo Baez  AGE: 63 y.o. SEX: male  : 1960     DATE: 2024     Assessment and Plan:     Problem List Items Addressed This Visit          Respiratory    LESIA (obstructive sleep apnea)     Continue with CPAP and following with sleep medicine.             Cardiovascular and Mediastinum    Hypertension     Not monitoring at home. Encouraged to monitor. Not at goal today. Continue with norvasc 5 mg and HCTZ 12.5 mg and Losartan 100 mg            Musculoskeletal and Integument    Skin lesion     Following with dermatology for removal.             Genitourinary    Benign prostatic hyperplasia       Other    Depression     Stable on Effexor         Hyperlipidemia     Coronary CT score 0.   The 10-year ASCVD risk score (Jasmyn NEVAREZ, et al., 2019) is: 15.4%    Values used to calculate the score:      Age: 63 years      Sex: Male      Is Non- : No      Diabetic: No      Tobacco smoker: No      Systolic Blood Pressure: 138 mmHg      Is BP treated: Yes      HDL Cholesterol: 48 mg/dL      Total Cholesterol: 231 mg/dL  No statin indicated. Lipid panel improving. Improve fiber.   Lab Results   Component Value Date    LDLCALC 155 (H) 2024   Re-check 6 months.          Relevant Orders    Comprehensive metabolic panel    Lipid panel    BMI 29.0-29.9,adult       Wt Readings from Last 3 Encounters:   24 94.8 kg (209 lb)   24 95.7 kg (211 lb)   23 96.6 kg (213 lb)     Improving. Continue with lifestyle changes.          Vitamin D deficiency     To be updated. On 3,000 IU daily.          Relevant Orders    Vitamin D 25 hydroxy    Prediabetes     Improved from 6.4 to 5.9%. Recheck 6 months. Continue with healthy lifestyle changes.          Relevant Orders    Hemoglobin A1C    History of tobacco use     Quit > 15 years ago in the .           Other Visit Diagnoses       Health  maintenance examination    -  Primary    Encounter for immunization        Prostate cancer screening        Relevant Orders    PSA, Total Screen    Vitamin B12 deficiency        Relevant Orders    Vitamin B12            Immunizations and preventive care screenings were discussed with patient today. Appropriate education was printed on patient's after visit summary.    Discussed risks and benefits of prostate cancer screening. We discussed the controversial history of PSA screening for prostate cancer in the [place] as well as the risk of over detection and over treatment of prostate cancer by way of PSA screening.  The patient understands that PSA blood testing is an imperfect way to screen for prostate cancer and that elevated PSA levels in the blood may also be caused by infection, inflammation, prostatic trauma or manipulation, urological procedures, or by benign prostatic enlargement.    The role of the digital rectal examination in prostate cancer screening was also discussed and I discussed with him that there is large interobserver variability in the findings of digital rectal examination.    Counseling:  Dental Health: discussed importance of regular tooth brushing, flossing, and dental visits.  Exercise: the importance of regular exercise/physical activity was discussed. Recommend exercise 3-5 times per week for at least 30 minutes.       Depression Screening and Follow-up Plan: Patient was screened for depression during today's encounter. They screened negative with a PHQ-9 score of 0.        Return for Recheck, Hypertension prediabetes .     Chief Complaint:     Chief Complaint   Patient presents with    Well Check      History of Present Illness:     Adult Annual Physical   Patient here for a comprehensive physical exam. Labs done to be reviewed.      Component      Latest Ref Rng 2/2/2024   WBC      4.31 - 10.16 Thousand/uL 9.19    RBC      3.88 - 5.62 Million/uL 5.17    Hemoglobin      12.0 -  17.0 g/dL 15.6    Hematocrit      36.5 - 49.3 % 44.8    MCV      82 - 98 fL 87    MCH      26.8 - 34.3 pg 30.2    MCHC      31.4 - 37.4 g/dL 34.8    RDW      11.6 - 15.1 % 13.7    MPV      8.9 - 12.7 fL 13.2 (H)    Platelet Count      149 - 390 Thousands/uL 301    nRBC      /100 WBCs 0    Neutrophils %      43 - 75 % 44    Immat GRANS %      0 - 2 % 0    Lymphocytes Relative      14 - 44 % 42    Monocytes Relative      4 - 12 % 9    Eosinophils      0 - 6 % 4    Basophils Relative      0 - 1 % 1    Absolute Neutrophils      1.85 - 7.62 Thousands/µL 4.01    Immature Grans Absolute      0.00 - 0.20 Thousand/uL 0.01    Lymphocytes Absolute      0.60 - 4.47 Thousands/µL 3.90    Absolute Monocytes      0.17 - 1.22 Thousand/µL 0.85    Absolute Eosinophils      0.00 - 0.61 Thousand/µL 0.32    Basophils Absolute      0.00 - 0.10 Thousands/µL 0.10    Sodium      135 - 147 mmol/L 142    Potassium      3.5 - 5.3 mmol/L 4.2    Chloride      96 - 108 mmol/L 104    Carbon Dioxide      21 - 32 mmol/L 26    ANION GAP      mmol/L 12    BUN      5 - 25 mg/dL 14    Creatinine      0.60 - 1.30 mg/dL 1.07    GLUCOSE, FASTING      65 - 99 mg/dL 115 (H)    Calcium      8.4 - 10.2 mg/dL 9.7    CORRECTED CALCIUM      8.3 - 10.1 mg/dL 9.3    AST      13 - 39 U/L 24    ALT      7 - 52 U/L 27    ALK PHOS      34 - 104 U/L 73    Total Protein      6.4 - 8.4 g/dL 7.7    Albumin      3.5 - 5.0 g/dL 4.5    Total Bilirubin      0.20 - 1.00 mg/dL 0.89    GFR, Calculated      ml/min/1.73sq m 73    Cholesterol      See Comment mg/dL 231 (H)    Triglycerides      See Comment mg/dL 138    HDL      >=40 mg/dL 48    LDL Calculated      0 - 100 mg/dL 155 (H)    Non-HDL Cholesterol      mg/dl 183    Hemoglobin A1C      Normal 4.0-5.6%; PreDiabetic 5.7-6.4%; Diabetic >=6.5%; Glycemic control for adults with diabetes <7.0% % 5.9 (H)    eAG, EST AVG Glucose      mg/dl 123    TSH 3RD GENERATON      0.450 - 4.500 uIU/mL 3.255         Diet and Physical  Activity  Diet/Nutrition: well balanced diet.   Exercise: no formal exercise.      Depression Screening  PHQ-2/9 Depression Screening    Little interest or pleasure in doing things: 0 - not at all  Feeling down, depressed, or hopeless: 0 - not at all  Trouble falling or staying asleep, or sleeping too much: 0 - not at all  Feeling tired or having little energy: 0 - not at all  Poor appetite or overeatin - not at all  Feeling bad about yourself - or that you are a failure or have let yourself or your family down: 0 - not at all  Trouble concentrating on things, such as reading the newspaper or watching television: 0 - not at all  Moving or speaking so slowly that other people could have noticed. Or the opposite - being so fidgety or restless that you have been moving around a lot more than usual: 0 - not at all  Thoughts that you would be better off dead, or of hurting yourself in some way: 0 - not at all  PHQ-9 Score: 0  PHQ-9 Interpretation: No or Minimal depression       General Health  Sleep: sleeps well.   Vision: goes for regular eye exams and most recent eye exam <1 year ago.   Dental: brushes teeth twice daily.        Health  Symptoms include: none     Review of Systems:     Review of Systems   Constitutional:  Negative for chills and fever.   Eyes:  Negative for discharge.   Respiratory:  Negative for shortness of breath.    Cardiovascular:  Negative for chest pain.   Gastrointestinal:  Negative for constipation and diarrhea.   Genitourinary:  Negative for difficulty urinating.   Musculoskeletal:  Negative for joint swelling.   Skin:  Negative for rash.   Neurological:  Negative for headaches.   Hematological:  Negative for adenopathy.   Psychiatric/Behavioral:  The patient is not nervous/anxious.       Past Medical History:     Past Medical History:   Diagnosis Date    Alcohol abuse     Uncomplicated    Bipolar I disorder, single manic episode (HCC) 10/09/2012    Colon cancer (HCC)     Last Assessed:  2/13/2013    Hearing loss     Hereditary spherocytosis (HCC)     Thinking about Suicide (HCC)       Past Surgical History:     Past Surgical History:   Procedure Laterality Date    CHOLECYSTECTOMY  1978    SPLENECTOMY  1978    due to spherocytosis      Family History:     Family History   Problem Relation Age of Onset    Colon cancer Mother     Hypertension Mother         Essential    Cancer Father         Gastric      Social History:     Social History     Socioeconomic History    Marital status: /Civil Union     Spouse name: None    Number of children: None    Years of education: None    Highest education level: None   Occupational History    None   Tobacco Use    Smoking status: Former    Smokeless tobacco: Never   Vaping Use    Vaping status: Never Used   Substance and Sexual Activity    Alcohol use: No     Comment: Recovering Alcoholic    Drug use: Never    Sexual activity: None   Other Topics Concern    None   Social History Narrative    None     Social Determinants of Health     Financial Resource Strain: Not on file   Food Insecurity: Not on file   Transportation Needs: Not on file   Physical Activity: Not on file   Stress: Not on file   Social Connections: Not on file   Intimate Partner Violence: Not on file   Housing Stability: Not on file      Current Medications:     Current Outpatient Medications   Medication Sig Dispense Refill    amLODIPine (NORVASC) 5 mg tablet TAKE ONE TABLET BY MOUTH DAILY 90 tablet 1    Cholecalciferol (VITAMIN D3) 3000 units TABS Take by mouth      hydrochlorothiazide (HYDRODIURIL) 12.5 mg tablet TAKE ONE TABLET (12.5 MG TOTAL) BY MOUTH DAILY 90 tablet 0    losartan (COZAAR) 100 MG tablet TAKE ONE TABLET BY MOUTH DAILY 90 tablet 0    venlafaxine (EFFEXOR-XR) 150 mg 24 hr capsule TAKE ONE CAPSULE BY MOUTH DAILY 90 capsule 1     No current facility-administered medications for this visit.      Allergies:     Allergies   Allergen Reactions    Lisinopril      Annotation -  "[id number]: tiredness    Prochlorperazine Myalgia     Annotation - [id number]: tardive dyskinesia    Quetiapine      Annotation - [id number]: sedation      Physical Exam:     /100   Pulse 66   Temp (!) 96.4 °F (35.8 °C) (Temporal)   Resp 16   Ht 5' 10.08\" (1.78 m)   Wt 94.8 kg (209 lb)   SpO2 98%   BMI 29.92 kg/m²     Physical Exam  Vitals and nursing note reviewed.   Constitutional:       General: He is not in acute distress.     Appearance: Normal appearance. He is not ill-appearing, toxic-appearing or diaphoretic.   HENT:      Head: Normocephalic and atraumatic.      Right Ear: Tympanic membrane, ear canal and external ear normal. There is impacted cerumen.      Left Ear: Tympanic membrane, ear canal and external ear normal. There is no impacted cerumen.      Nose: Nose normal.      Mouth/Throat:      Mouth: Mucous membranes are moist.      Pharynx: Oropharynx is clear. No oropharyngeal exudate or posterior oropharyngeal erythema.   Eyes:      General: Lids are normal. No scleral icterus.        Right eye: No discharge.         Left eye: No discharge.      Extraocular Movements: Extraocular movements intact.      Conjunctiva/sclera: Conjunctivae normal.      Pupils: Pupils are equal, round, and reactive to light.   Cardiovascular:      Rate and Rhythm: Normal rate and regular rhythm. No extrasystoles are present.     Heart sounds: S1 normal and S2 normal. No murmur heard.  Pulmonary:      Effort: Pulmonary effort is normal. No respiratory distress.      Breath sounds: Normal breath sounds. No stridor or decreased air movement. No wheezing or rhonchi.   Abdominal:      General: Bowel sounds are normal.      Palpations: Abdomen is soft.      Tenderness: There is no abdominal tenderness.   Musculoskeletal:         General: Normal range of motion.      Cervical back: Normal range of motion and neck supple.   Skin:     General: Skin is warm and dry.   Neurological:      General: No focal deficit present.     "  Mental Status: He is alert and oriented to person, place, and time. Mental status is at baseline.      Cranial Nerves: No cranial nerve deficit.      Sensory: No sensory deficit.      Motor: No weakness.      Coordination: Coordination normal.      Gait: Gait normal.   Psychiatric:         Attention and Perception: Attention and perception normal.         Mood and Affect: Mood and affect normal.         Speech: Speech normal.         Behavior: Behavior is cooperative.         Cognition and Memory: Cognition normal.         Judgment: Judgment normal.          MUNDO Nur  [facility] PRIMARY [facility]

## 2024-02-05 NOTE — ASSESSMENT & PLAN NOTE
Wt Readings from Last 3 Encounters:   02/05/24 94.8 kg (209 lb)   01/04/24 95.7 kg (211 lb)   12/28/23 96.6 kg (213 lb)     Improving. Continue with lifestyle changes.

## 2024-02-05 NOTE — ASSESSMENT & PLAN NOTE
Coronary CT score 0.   The 10-year ASCVD risk score (Jasmyn NEVAREZ, et al., 2019) is: 15.4%    Values used to calculate the score:      Age: 63 years      Sex: Male      Is Non- : No      Diabetic: No      Tobacco smoker: No      Systolic Blood Pressure: 138 mmHg      Is BP treated: Yes      HDL Cholesterol: 48 mg/dL      Total Cholesterol: 231 mg/dL  No statin indicated. Lipid panel improving. Improve fiber.   Lab Results   Component Value Date    LDLCALC 155 (H) 02/02/2024   Re-check 6 months.

## 2024-02-08 ENCOUNTER — APPOINTMENT (OUTPATIENT)
Dept: LAB | Facility: CLINIC | Age: 64
End: 2024-02-08
Payer: COMMERCIAL

## 2024-02-08 DIAGNOSIS — E53.8 VITAMIN B12 DEFICIENCY: ICD-10-CM

## 2024-02-08 DIAGNOSIS — E55.9 VITAMIN D DEFICIENCY: ICD-10-CM

## 2024-02-08 DIAGNOSIS — Z00.6 ENCOUNTER FOR EXAMINATION FOR NORMAL COMPARISON OR CONTROL IN CLINICAL RESEARCH PROGRAM: ICD-10-CM

## 2024-02-08 LAB
25(OH)D3 SERPL-MCNC: 48.9 NG/ML (ref 30–100)
VIT B12 SERPL-MCNC: 239 PG/ML (ref 180–914)

## 2024-02-08 PROCEDURE — 82607 VITAMIN B-12: CPT

## 2024-02-08 PROCEDURE — 36415 COLL VENOUS BLD VENIPUNCTURE: CPT

## 2024-02-08 PROCEDURE — 82306 VITAMIN D 25 HYDROXY: CPT

## 2024-02-09 DIAGNOSIS — I10 ESSENTIAL HYPERTENSION: ICD-10-CM

## 2024-02-09 RX ORDER — HYDROCHLOROTHIAZIDE 12.5 MG/1
TABLET ORAL
Qty: 90 TABLET | Refills: 1 | Status: SHIPPED | OUTPATIENT
Start: 2024-02-09

## 2024-02-09 RX ORDER — AMLODIPINE BESYLATE 5 MG/1
TABLET ORAL
Qty: 90 TABLET | Refills: 1 | Status: SHIPPED | OUTPATIENT
Start: 2024-02-09

## 2024-02-13 DIAGNOSIS — F32.A DEPRESSION, UNSPECIFIED DEPRESSION TYPE: ICD-10-CM

## 2024-02-16 RX ORDER — VENLAFAXINE HYDROCHLORIDE 150 MG/1
CAPSULE, EXTENDED RELEASE ORAL
Qty: 90 CAPSULE | Refills: 1 | Status: SHIPPED | OUTPATIENT
Start: 2024-02-16

## 2024-02-23 DIAGNOSIS — I10 ESSENTIAL HYPERTENSION: ICD-10-CM

## 2024-02-23 RX ORDER — LOSARTAN POTASSIUM 100 MG/1
TABLET ORAL
Qty: 90 TABLET | Refills: 1 | Status: SHIPPED | OUTPATIENT
Start: 2024-02-23

## 2024-03-03 LAB
APOB+LDLR+PCSK9 GENE MUT ANL BLD/T: NOT DETECTED
BRCA1+BRCA2 DEL+DUP + FULL MUT ANL BLD/T: NOT DETECTED
MLH1+MSH2+MSH6+PMS2 GN DEL+DUP+FUL M: NOT DETECTED

## 2024-04-22 DIAGNOSIS — I10 ESSENTIAL HYPERTENSION: ICD-10-CM

## 2024-04-22 RX ORDER — HYDROCHLOROTHIAZIDE 12.5 MG/1
TABLET ORAL
Qty: 90 TABLET | Refills: 1 | Status: SHIPPED | OUTPATIENT
Start: 2024-04-22

## 2024-04-29 DIAGNOSIS — F32.A DEPRESSION, UNSPECIFIED DEPRESSION TYPE: ICD-10-CM

## 2024-04-29 RX ORDER — VENLAFAXINE HYDROCHLORIDE 150 MG/1
CAPSULE, EXTENDED RELEASE ORAL
Qty: 90 CAPSULE | Refills: 1 | Status: SHIPPED | OUTPATIENT
Start: 2024-04-29

## 2024-08-05 DIAGNOSIS — I10 ESSENTIAL HYPERTENSION: ICD-10-CM

## 2024-08-06 DIAGNOSIS — I10 ESSENTIAL HYPERTENSION: ICD-10-CM

## 2024-08-06 RX ORDER — AMLODIPINE BESYLATE 5 MG/1
TABLET ORAL
Qty: 90 TABLET | Refills: 1 | Status: SHIPPED | OUTPATIENT
Start: 2024-08-06

## 2024-08-07 RX ORDER — LOSARTAN POTASSIUM 100 MG/1
TABLET ORAL
Qty: 90 TABLET | Refills: 1 | Status: SHIPPED | OUTPATIENT
Start: 2024-08-07

## 2024-09-23 ENCOUNTER — APPOINTMENT (OUTPATIENT)
Dept: LAB | Facility: CLINIC | Age: 64
End: 2024-09-23
Payer: COMMERCIAL

## 2024-09-23 DIAGNOSIS — E78.5 HYPERLIPIDEMIA, UNSPECIFIED HYPERLIPIDEMIA TYPE: ICD-10-CM

## 2024-09-23 DIAGNOSIS — Z12.5 PROSTATE CANCER SCREENING: ICD-10-CM

## 2024-09-23 DIAGNOSIS — R73.03 PREDIABETES: ICD-10-CM

## 2024-09-23 LAB
ALBUMIN SERPL BCG-MCNC: 4.6 G/DL (ref 3.5–5)
ALP SERPL-CCNC: 77 U/L (ref 34–104)
ALT SERPL W P-5'-P-CCNC: 23 U/L (ref 7–52)
ANION GAP SERPL CALCULATED.3IONS-SCNC: 10 MMOL/L (ref 4–13)
AST SERPL W P-5'-P-CCNC: 19 U/L (ref 13–39)
BILIRUB SERPL-MCNC: 0.84 MG/DL (ref 0.2–1)
BUN SERPL-MCNC: 17 MG/DL (ref 5–25)
CALCIUM SERPL-MCNC: 9.5 MG/DL (ref 8.4–10.2)
CHLORIDE SERPL-SCNC: 99 MMOL/L (ref 96–108)
CHOLEST SERPL-MCNC: 206 MG/DL
CO2 SERPL-SCNC: 30 MMOL/L (ref 21–32)
CREAT SERPL-MCNC: 0.89 MG/DL (ref 0.6–1.3)
EST. AVERAGE GLUCOSE BLD GHB EST-MCNC: 117 MG/DL
GFR SERPL CREATININE-BSD FRML MDRD: 90 ML/MIN/1.73SQ M
GLUCOSE P FAST SERPL-MCNC: 115 MG/DL (ref 65–99)
HBA1C MFR BLD: 5.7 %
HDLC SERPL-MCNC: 42 MG/DL
LDLC SERPL CALC-MCNC: 122 MG/DL (ref 0–100)
NONHDLC SERPL-MCNC: 164 MG/DL
POTASSIUM SERPL-SCNC: 3.8 MMOL/L (ref 3.5–5.3)
PROT SERPL-MCNC: 7.7 G/DL (ref 6.4–8.4)
PSA SERPL-MCNC: 0.58 NG/ML (ref 0–4)
SODIUM SERPL-SCNC: 139 MMOL/L (ref 135–147)
TRIGL SERPL-MCNC: 208 MG/DL

## 2024-09-23 PROCEDURE — 80053 COMPREHEN METABOLIC PANEL: CPT

## 2024-09-23 PROCEDURE — 83036 HEMOGLOBIN GLYCOSYLATED A1C: CPT

## 2024-09-23 PROCEDURE — 36415 COLL VENOUS BLD VENIPUNCTURE: CPT

## 2024-09-23 PROCEDURE — 80061 LIPID PANEL: CPT

## 2024-09-23 PROCEDURE — G0103 PSA SCREENING: HCPCS

## 2024-10-03 ENCOUNTER — OFFICE VISIT (OUTPATIENT)
Dept: FAMILY MEDICINE CLINIC | Facility: CLINIC | Age: 64
End: 2024-10-03
Payer: COMMERCIAL

## 2024-10-03 VITALS
TEMPERATURE: 96.4 F | RESPIRATION RATE: 16 BRPM | BODY MASS INDEX: 30.92 KG/M2 | WEIGHT: 216 LBS | SYSTOLIC BLOOD PRESSURE: 142 MMHG | HEART RATE: 69 BPM | OXYGEN SATURATION: 97 % | HEIGHT: 70 IN | DIASTOLIC BLOOD PRESSURE: 82 MMHG

## 2024-10-03 DIAGNOSIS — R73.03 PREDIABETES: ICD-10-CM

## 2024-10-03 DIAGNOSIS — I10 ESSENTIAL HYPERTENSION: ICD-10-CM

## 2024-10-03 DIAGNOSIS — N40.0 BENIGN PROSTATIC HYPERPLASIA, UNSPECIFIED WHETHER LOWER URINARY TRACT SYMPTOMS PRESENT: ICD-10-CM

## 2024-10-03 DIAGNOSIS — E78.5 HYPERLIPIDEMIA, UNSPECIFIED HYPERLIPIDEMIA TYPE: ICD-10-CM

## 2024-10-03 DIAGNOSIS — Z23 ENCOUNTER FOR IMMUNIZATION: ICD-10-CM

## 2024-10-03 DIAGNOSIS — I10 ESSENTIAL HYPERTENSION: Primary | ICD-10-CM

## 2024-10-03 PROCEDURE — 90673 RIV3 VACCINE NO PRESERV IM: CPT

## 2024-10-03 PROCEDURE — 99214 OFFICE O/P EST MOD 30 MIN: CPT | Performed by: NURSE PRACTITIONER

## 2024-10-03 PROCEDURE — 90471 IMMUNIZATION ADMIN: CPT

## 2024-10-03 RX ORDER — HYDROCHLOROTHIAZIDE 12.5 MG/1
12.5 TABLET ORAL DAILY
Qty: 90 TABLET | Refills: 1 | Status: SHIPPED | OUTPATIENT
Start: 2024-10-03

## 2024-10-03 NOTE — ASSESSMENT & PLAN NOTE
Improved. Recheck 6 months. Continue with healthy lifestyle changes.   Lab Results   Component Value Date    HGBA1C 5.7 (H) 09/23/2024         Orders:    Hemoglobin A1C; Future

## 2024-10-03 NOTE — PATIENT INSTRUCTIONS
Borderline. Monitor at home. Call with numbers in next few weeks. Goal < 130/80    Recheck 6 months.     Please call the office if you are experiencing any worsening of symptoms or no symptom improvement.

## 2024-10-03 NOTE — PROGRESS NOTES
Ambulatory Visit  Name: Arturo Baez      : 1960      MRN: [id number]  Encounter Provider: MUNDO Nur  Encounter Date: 10/3/2024   Encounter department: [facility] PRIMARY CARE    Assessment & Plan  Essential hypertension  Borderline. Monitor at home. Call with numbers in next few weeks. Goal <130/80  Orders:    TSH, 3rd generation with Free T4 reflex; Future    CBC and differential; Future    Comprehensive metabolic panel; Future    Prediabetes  Improved. Recheck 6 months. Continue with healthy lifestyle changes.   Lab Results   Component Value Date    HGBA1C 5.7 (H) 2024         Orders:    Hemoglobin A1C; Future    Hyperlipidemia, unspecified hyperlipidemia type  Coronary CT score 0.   The 10-year ASCVD risk score (Jasmyn NEVAREZ, et al., 2019) is: 17.2%    Values used to calculate the score:      Age: 64 years      Sex: Male      Is Non- : No      Diabetic: No      Tobacco smoker: No      Systolic Blood Pressure: 142 mmHg      Is BP treated: Yes      HDL Cholesterol: 42 mg/dL      Total Cholesterol: 206 mg/dL  No statin indicated. Lipid panel improving. Improve fiber.   Lab Results   Component Value Date    LDLCALC 122 (H) 2024   Re-check 6 months.     Orders:    TSH, 3rd generation with Free T4 reflex; Future    Lipid panel; Future    Benign prostatic hyperplasia, unspecified whether lower urinary tract symptoms present  Symptoms stable - worse with some constipation at times.   Lab Results   Component Value Date    PSA 0.585 2024    PSA 0.24 2023    PSA 0.40 2022   Stable          Encounter for immunization    Orders:    influenza vaccine, recombinant, PF, 0.5 mL IM (Flublok)       History of Present Illness     Patient presents to the office today for follow-up regarding prediabetes and hypertension.    Labs were done  for review.  Yearly PSA did increase little bit from 0.2-0.5.  A1c improved from 5.9% to now 5.7%.   "Total cholesterol also improved with an LDL down from 155 to now 122.  Total came down from 231 to now 206.  Metabolic panel was stable.  Patient was to monitor blood pressure at home.   Hasn't started formal exercise. Has really improved diet with some good changes.   Hasn't monitored blood pressure at home.   Overall doing pretty well.           Review of Systems   Constitutional:  Negative for chills and fever.   Eyes:  Negative for discharge.   Respiratory:  Negative for shortness of breath.    Cardiovascular:  Negative for chest pain.   Gastrointestinal:  Negative for constipation and diarrhea.   Genitourinary:  Negative for difficulty urinating.   Musculoskeletal:  Negative for joint swelling.   Skin:  Negative for rash.   Neurological:  Negative for headaches.   Hematological:  Negative for adenopathy.   Psychiatric/Behavioral:  The patient is not nervous/anxious.            Objective     /82   Pulse 69   Temp (!) 96.4 °F (35.8 °C) (Temporal)   Resp 16   Ht 5' 10\" (1.778 m)   Wt 98 kg (216 lb)   SpO2 97%   BMI 30.99 kg/m²     Physical Exam  Vitals and nursing note reviewed.   Constitutional:       General: He is not in acute distress.     Appearance: Normal appearance. He is well-developed. He is obese. He is not diaphoretic.   HENT:      Head: Normocephalic and atraumatic.      Right Ear: External ear normal.      Left Ear: External ear normal.   Eyes:      General: Lids are normal.         Right eye: No discharge.         Left eye: No discharge.      Conjunctiva/sclera: Conjunctivae normal.   Pulmonary:      Effort: Pulmonary effort is normal. No respiratory distress.   Musculoskeletal:         General: No deformity.   Skin:     General: Skin is warm and dry.   Neurological:      General: No focal deficit present.      Mental Status: He is alert and oriented to person, place, and time.   Psychiatric:         Speech: Speech normal.         Behavior: Behavior normal.         Thought Content: " Thought content normal.         Judgment: Judgment normal.

## 2024-10-03 NOTE — ASSESSMENT & PLAN NOTE
Symptoms stable - worse with some constipation at times.   Lab Results   Component Value Date    PSA 0.585 09/23/2024    PSA 0.24 08/30/2023    PSA 0.40 02/03/2022   Stable

## 2024-10-03 NOTE — ASSESSMENT & PLAN NOTE
Coronary CT score 0.   The 10-year ASCVD risk score (Jasmyn NEVAREZ, et al., 2019) is: 17.2%    Values used to calculate the score:      Age: 64 years      Sex: Male      Is Non- : No      Diabetic: No      Tobacco smoker: No      Systolic Blood Pressure: 142 mmHg      Is BP treated: Yes      HDL Cholesterol: 42 mg/dL      Total Cholesterol: 206 mg/dL  No statin indicated. Lipid panel improving. Improve fiber.   Lab Results   Component Value Date    LDLCALC 122 (H) 09/23/2024   Re-check 6 months.     Orders:    TSH, 3rd generation with Free T4 reflex; Future    Lipid panel; Future

## 2024-10-18 DIAGNOSIS — F32.A DEPRESSION, UNSPECIFIED DEPRESSION TYPE: ICD-10-CM

## 2024-10-21 RX ORDER — VENLAFAXINE HYDROCHLORIDE 150 MG/1
CAPSULE, EXTENDED RELEASE ORAL
Qty: 90 CAPSULE | Refills: 1 | Status: SHIPPED | OUTPATIENT
Start: 2024-10-21

## 2024-10-26 DIAGNOSIS — I10 ESSENTIAL HYPERTENSION: ICD-10-CM

## 2024-10-27 RX ORDER — LOSARTAN POTASSIUM 100 MG/1
TABLET ORAL
Qty: 90 TABLET | Refills: 1 | Status: SHIPPED | OUTPATIENT
Start: 2024-10-27

## 2025-04-02 ENCOUNTER — APPOINTMENT (OUTPATIENT)
Dept: LAB | Facility: MEDICAL CENTER | Age: 65
End: 2025-04-02
Payer: COMMERCIAL

## 2025-04-02 DIAGNOSIS — E78.5 HYPERLIPIDEMIA, UNSPECIFIED HYPERLIPIDEMIA TYPE: ICD-10-CM

## 2025-04-02 DIAGNOSIS — R73.03 PREDIABETES: ICD-10-CM

## 2025-04-02 DIAGNOSIS — I10 ESSENTIAL HYPERTENSION: ICD-10-CM

## 2025-04-02 LAB
ALBUMIN SERPL BCG-MCNC: 4.6 G/DL (ref 3.5–5)
ALP SERPL-CCNC: 72 U/L (ref 34–104)
ALT SERPL W P-5'-P-CCNC: 20 U/L (ref 7–52)
ANION GAP SERPL CALCULATED.3IONS-SCNC: 8 MMOL/L (ref 4–13)
AST SERPL W P-5'-P-CCNC: 21 U/L (ref 13–39)
BASOPHILS # BLD MANUAL: 0.21 THOUSAND/UL (ref 0–0.1)
BASOPHILS NFR MAR MANUAL: 2 % (ref 0–1)
BILIRUB SERPL-MCNC: 0.9 MG/DL (ref 0.2–1)
BUN SERPL-MCNC: 19 MG/DL (ref 5–25)
CALCIUM SERPL-MCNC: 9.3 MG/DL (ref 8.4–10.2)
CHLORIDE SERPL-SCNC: 103 MMOL/L (ref 96–108)
CHOLEST SERPL-MCNC: 219 MG/DL (ref ?–200)
CO2 SERPL-SCNC: 30 MMOL/L (ref 21–32)
CREAT SERPL-MCNC: 0.94 MG/DL (ref 0.6–1.3)
EOSINOPHIL # BLD MANUAL: 0.32 THOUSAND/UL (ref 0–0.4)
EOSINOPHIL NFR BLD MANUAL: 3 % (ref 0–6)
ERYTHROCYTE [DISTWIDTH] IN BLOOD BY AUTOMATED COUNT: 13.3 % (ref 11.6–15.1)
EST. AVERAGE GLUCOSE BLD GHB EST-MCNC: 123 MG/DL
GFR SERPL CREATININE-BSD FRML MDRD: 85 ML/MIN/1.73SQ M
GLUCOSE P FAST SERPL-MCNC: 98 MG/DL (ref 65–99)
HBA1C MFR BLD: 5.9 %
HCT VFR BLD AUTO: 45.4 % (ref 36.5–49.3)
HDLC SERPL-MCNC: 43 MG/DL
HELMET CELLS BLD QL SMEAR: PRESENT
HGB BLD-MCNC: 15.3 G/DL (ref 12–17)
LDLC SERPL CALC-MCNC: 128 MG/DL (ref 0–100)
LYMPHOCYTES # BLD AUTO: 4.82 THOUSAND/UL (ref 0.6–4.47)
LYMPHOCYTES # BLD AUTO: 45 % (ref 14–44)
MCH RBC QN AUTO: 30.2 PG (ref 26.8–34.3)
MCHC RBC AUTO-ENTMCNC: 33.7 G/DL (ref 31.4–37.4)
MCV RBC AUTO: 90 FL (ref 82–98)
MONOCYTES # BLD AUTO: 0.96 THOUSAND/UL (ref 0–1.22)
MONOCYTES NFR BLD: 9 % (ref 4–12)
NEUTROPHILS # BLD MANUAL: 4.39 THOUSAND/UL (ref 1.85–7.62)
NEUTS SEG NFR BLD AUTO: 41 % (ref 43–75)
NONHDLC SERPL-MCNC: 176 MG/DL
OVALOCYTES BLD QL SMEAR: PRESENT
PLATELET # BLD AUTO: 278 THOUSANDS/UL (ref 149–390)
PLATELET BLD QL SMEAR: ADEQUATE
PMV BLD AUTO: 12.6 FL (ref 8.9–12.7)
POIKILOCYTOSIS BLD QL SMEAR: PRESENT
POTASSIUM SERPL-SCNC: 3.5 MMOL/L (ref 3.5–5.3)
PROT SERPL-MCNC: 7.7 G/DL (ref 6.4–8.4)
RBC # BLD AUTO: 5.07 MILLION/UL (ref 3.88–5.62)
RBC MORPH BLD: PRESENT
SODIUM SERPL-SCNC: 141 MMOL/L (ref 135–147)
TRIGL SERPL-MCNC: 240 MG/DL (ref ?–150)
TSH SERPL DL<=0.05 MIU/L-ACNC: 1.83 UIU/ML (ref 0.45–4.5)
WBC # BLD AUTO: 10.71 THOUSAND/UL (ref 4.31–10.16)

## 2025-04-02 PROCEDURE — 85027 COMPLETE CBC AUTOMATED: CPT

## 2025-04-02 PROCEDURE — 80061 LIPID PANEL: CPT

## 2025-04-02 PROCEDURE — 83036 HEMOGLOBIN GLYCOSYLATED A1C: CPT

## 2025-04-02 PROCEDURE — 84443 ASSAY THYROID STIM HORMONE: CPT

## 2025-04-02 PROCEDURE — 85007 BL SMEAR W/DIFF WBC COUNT: CPT

## 2025-04-02 PROCEDURE — 36415 COLL VENOUS BLD VENIPUNCTURE: CPT

## 2025-04-02 PROCEDURE — 80053 COMPREHEN METABOLIC PANEL: CPT

## 2025-04-03 ENCOUNTER — TELEPHONE (OUTPATIENT)
Age: 65
End: 2025-04-03

## 2025-04-03 ENCOUNTER — OFFICE VISIT (OUTPATIENT)
Dept: FAMILY MEDICINE CLINIC | Facility: CLINIC | Age: 65
End: 2025-04-03
Payer: COMMERCIAL

## 2025-04-03 ENCOUNTER — RESULTS FOLLOW-UP (OUTPATIENT)
Dept: FAMILY MEDICINE CLINIC | Facility: CLINIC | Age: 65
End: 2025-04-03

## 2025-04-03 VITALS
WEIGHT: 215.4 LBS | TEMPERATURE: 97.1 F | HEART RATE: 74 BPM | DIASTOLIC BLOOD PRESSURE: 80 MMHG | SYSTOLIC BLOOD PRESSURE: 130 MMHG | HEIGHT: 70 IN | RESPIRATION RATE: 16 BRPM | BODY MASS INDEX: 30.84 KG/M2 | OXYGEN SATURATION: 98 %

## 2025-04-03 DIAGNOSIS — N40.0 BENIGN PROSTATIC HYPERPLASIA, UNSPECIFIED WHETHER LOWER URINARY TRACT SYMPTOMS PRESENT: ICD-10-CM

## 2025-04-03 DIAGNOSIS — Z87.891 HISTORY OF TOBACCO USE: ICD-10-CM

## 2025-04-03 DIAGNOSIS — F32.A DEPRESSION, UNSPECIFIED DEPRESSION TYPE: ICD-10-CM

## 2025-04-03 DIAGNOSIS — L98.9 SKIN LESION: ICD-10-CM

## 2025-04-03 DIAGNOSIS — I10 PRIMARY HYPERTENSION: ICD-10-CM

## 2025-04-03 DIAGNOSIS — D58.9 HEREDITARY HEMOLYTIC ANEMIA (HCC): ICD-10-CM

## 2025-04-03 DIAGNOSIS — R41.840 INATTENTION: ICD-10-CM

## 2025-04-03 DIAGNOSIS — R73.03 PREDIABETES: ICD-10-CM

## 2025-04-03 DIAGNOSIS — R01.1 SYSTOLIC EJECTION MURMUR: ICD-10-CM

## 2025-04-03 DIAGNOSIS — Z00.00 HEALTH MAINTENANCE EXAMINATION: Primary | ICD-10-CM

## 2025-04-03 DIAGNOSIS — E78.5 HYPERLIPIDEMIA, UNSPECIFIED HYPERLIPIDEMIA TYPE: ICD-10-CM

## 2025-04-03 DIAGNOSIS — Z12.11 COLON CANCER SCREENING: ICD-10-CM

## 2025-04-03 DIAGNOSIS — Z80.0 FAMILY HISTORY OF COLON CANCER IN MOTHER: ICD-10-CM

## 2025-04-03 DIAGNOSIS — G47.33 OSA (OBSTRUCTIVE SLEEP APNEA): ICD-10-CM

## 2025-04-03 PROCEDURE — 99214 OFFICE O/P EST MOD 30 MIN: CPT | Performed by: NURSE PRACTITIONER

## 2025-04-03 PROCEDURE — 99396 PREV VISIT EST AGE 40-64: CPT | Performed by: NURSE PRACTITIONER

## 2025-04-03 RX ORDER — HYDROCHLOROTHIAZIDE 12.5 MG/1
12.5 TABLET ORAL DAILY
Qty: 90 TABLET | Refills: 1 | Status: SHIPPED | OUTPATIENT
Start: 2025-04-03

## 2025-04-03 NOTE — ASSESSMENT & PLAN NOTE
Lab Results   Component Value Date    PSA 0.585 09/23/2024    PSA 0.24 08/30/2023    PSA 0.40 02/03/2022    Stable   Asymptomatic

## 2025-04-03 NOTE — ASSESSMENT & PLAN NOTE
Encouraged follow up with dermatology for eval of lesion right parietal scalp- he will call. Previously referred. Discussed risks of not following up

## 2025-04-03 NOTE — PROGRESS NOTES
Adult Annual Physical  Name: Arturo Baez      : 1960      MRN: [id number]  Encounter Provider: MUNDO Nur  Encounter Date: 4/3/2025   Encounter department: [facility] PRIMARY CARE    :  Assessment & Plan  Health maintenance examination    Orders:  •  Comprehensive metabolic panel; Future    LESIA (obstructive sleep apnea)  Continue with CPAP and following with sleep medicine.        Primary hypertension  Stable/ at goal. Asymptomatic.  Continue with norvasc 5 mg and HCTZ 12.5 mg and Losartan 100 mg       Benign prostatic hyperplasia, unspecified whether lower urinary tract symptoms present  Lab Results   Component Value Date    PSA 0.585 2024    PSA 0.24 2023    PSA 0.40 2022    Stable   Asymptomatic        History of tobacco use  Quit > 15 years ago in the .        Prediabetes  Improved. Recheck 6 months. Continue with healthy lifestyle changes.   Lab Results   Component Value Date    HGBA1C 5.9 (H) 2025   Stable. Encouraged exercise    Orders:  •  Hemoglobin A1C; Future    Skin lesion  Encouraged follow up with dermatology for eval of lesion right parietal scalp- he will call. Previously referred. Discussed risks of not following up        Colon cancer screening  Referral placed for colonoscopy  Orders:  •  Ambulatory Referral to Gastroenterology; Future    Family history of colon cancer in mother  Referral placed for colonoscopy  Orders:  •  Ambulatory Referral to Gastroenterology; Future    Hyperlipidemia, unspecified hyperlipidemia type  Coronary CT score 0. Done in   The 10-year ASCVD risk score (Jasmyn NEVAREZ, et al., 2019) is: 16.4%    Values used to calculate the score:      Age: 65 years      Sex: Male      Is Non- : No      Diabetic: No      Tobacco smoker: No      Systolic Blood Pressure: 130 mmHg      Is BP treated: Yes      HDL Cholesterol: 43 mg/dL      Total Cholesterol: 219 mg/dL  No statin indicated. Lipid panel  "improving. Improve fiber.   Lab Results   Component Value Date    LDLCALC 128 (H) 04/02/2025   Re-check 6 months.       Orders:  •  Lipid panel; Future    Hereditary hemolytic anemia (HCC)  Diagnosis as a child per patient. Had spleen out at age 19- had abdominal pain due to \"blood stones\" and gallbladder removed as well.   Orders:  •  CBC and differential; Future    Depression, unspecified depression type  Stable on Effexor  Orders:  •  Ambulatory referral to Psych Services; Future    Inattention  Would like to discuss further medication options- referral placed  Orders:  •  Ambulatory referral to Psych Services; Future    Systolic ejection murmur    Echo done 2021:    LEFT VENTRICLE:  Systolic function was normal by visual assessment. Ejection fraction was estimated to be 60 %.  There were no regional wall motion abnormalities.  Wall thickness was moderately increased.  Doppler parameters were consistent with abnormal left ventricular relaxation (grade 1 diastolic dysfunction).     MITRAL VALVE:  There was trace regurgitation.     AORTIC VALVE:  Transaortic velocity was increased due to increased transvalvular flow.     TRICUSPID VALVE:  There was mild regurgitation.     PULMONIC VALVE:  There was trace regurgitation.    Repeat echo ordered- not yet completed by patient           Preventive Screenings:  - Diabetes Screening: screening up-to-date  - Cholesterol Screening: screening not indicated and has hyperlipidemia   - Colon cancer screening: risks/benefits discussed and orders placed   - Lung cancer screening: screening not indicated   - Prostate cancer screening: screening up-to-date     Immunizations:  - Immunizations due: Prevnar 20, Zoster (Shingrix) and HPV (Gardasil 9)    Counseling/Anticipatory Guidance:    - Dental health: discussed importance of regular tooth brushing, flossing, and dental visits.   - Diet: discussed recommendations for a healthy/well-balanced diet.   - Exercise: the importance of " "regular exercise/physical activity was discussed. Recommend exercise 3-5 times per week for at least 30 minutes.          History of Present Illness     Adult Annual Physical:  Patient presents for annual physical. Here for well exam. Overall doing well. Labs done to be reviewed.   .     Diet and Physical Activity:  - Diet/Nutrition: no special diet, limited junk food and intermittent fasting.  - Exercise: no formal exercise.    General Health:  - Sleep: sleeps poorly, 4-6 hours of sleep on average, snores loudly and unrefreshing sleep.  - Hearing: normal hearing bilateral ears.  - Vision: most recent eye exam > 1 year ago and wears glasses.  - Dental: regular dental visits, brushes teeth once daily and does not floss. Will be getting upper dentures later 2025    /GYN Health:  - Follows with GYN: no.   - History of STDs: no     Health:  - History of STDs: no.   - Urinary symptoms: none.     Advanced Care Planning:  - Has an advanced directive?: no    - Has a durable medical POA?: no    - ACP document given to patient?: no      Review of Systems   Constitutional:  Negative for chills and fever.   Eyes:  Negative for discharge.   Respiratory:  Negative for shortness of breath.    Cardiovascular:  Negative for chest pain.   Gastrointestinal:  Negative for constipation and diarrhea.   Genitourinary:  Negative for difficulty urinating.   Musculoskeletal:  Negative for joint swelling.   Skin:  Negative for rash.   Neurological:  Negative for headaches.   Hematological:  Negative for adenopathy.   Psychiatric/Behavioral:  The patient is not nervous/anxious.          Objective   /80 (BP Location: Left arm, Patient Position: Sitting, Cuff Size: Large)   Pulse 74   Temp (!) 97.1 °F (36.2 °C) (Temporal)   Resp 16   Ht 5' 10\" (1.778 m)   Wt 97.7 kg (215 lb 6.4 oz)   SpO2 98%   BMI 30.91 kg/m²     Physical Exam  Vitals and nursing note reviewed.   Constitutional:       General: He is not in acute distress.     " Appearance: Normal appearance. He is obese. He is not ill-appearing, toxic-appearing or diaphoretic.   HENT:      Head: Normocephalic and atraumatic.      Right Ear: External ear normal.      Left Ear: External ear normal.      Nose: Nose normal.      Mouth/Throat:      Mouth: Mucous membranes are moist.      Pharynx: Oropharynx is clear. No oropharyngeal exudate or posterior oropharyngeal erythema.   Eyes:      General: Lids are normal. No scleral icterus.        Right eye: No discharge.         Left eye: No discharge.      Extraocular Movements: Extraocular movements intact.      Conjunctiva/sclera: Conjunctivae normal.      Pupils: Pupils are equal, round, and reactive to light.   Cardiovascular:      Rate and Rhythm: Normal rate and regular rhythm. No extrasystoles are present.     Heart sounds: S1 normal and S2 normal. Murmur heard.   Pulmonary:      Effort: Pulmonary effort is normal. No respiratory distress.      Breath sounds: Normal breath sounds. No stridor or decreased air movement. No wheezing or rhonchi.   Abdominal:      General: Bowel sounds are normal.      Palpations: Abdomen is soft.   Musculoskeletal:         General: Normal range of motion.      Cervical back: Normal range of motion and neck supple.   Skin:     General: Skin is warm and dry.   Neurological:      General: No focal deficit present.      Mental Status: He is alert and oriented to person, place, and time. Mental status is at baseline.      Cranial Nerves: No cranial nerve deficit.      Sensory: No sensory deficit.      Motor: No weakness.      Coordination: Coordination normal.      Gait: Gait normal.   Psychiatric:         Attention and Perception: Attention and perception normal.         Mood and Affect: Mood and affect normal.         Speech: Speech normal.         Behavior: Behavior is cooperative.         Cognition and Memory: Cognition normal.         Judgment: Judgment normal.

## 2025-04-03 NOTE — ASSESSMENT & PLAN NOTE
Stable/ at goal. Asymptomatic.  Continue with norvasc 5 mg and HCTZ 12.5 mg and Losartan 100 mg

## 2025-04-03 NOTE — ASSESSMENT & PLAN NOTE
Improved. Recheck 6 months. Continue with healthy lifestyle changes.   Lab Results   Component Value Date    HGBA1C 5.9 (H) 04/02/2025   Stable. Encouraged exercise    Orders:  •  Hemoglobin A1C; Future

## 2025-04-03 NOTE — ASSESSMENT & PLAN NOTE
Coronary CT score 0. Done in 2023  The 10-year ASCVD risk score (Jasmyn NEVAREZ, et al., 2019) is: 16.4%    Values used to calculate the score:      Age: 65 years      Sex: Male      Is Non- : No      Diabetic: No      Tobacco smoker: No      Systolic Blood Pressure: 130 mmHg      Is BP treated: Yes      HDL Cholesterol: 43 mg/dL      Total Cholesterol: 219 mg/dL  No statin indicated. Lipid panel improving. Improve fiber.   Lab Results   Component Value Date    LDLCALC 128 (H) 04/02/2025   Re-check 6 months.       Orders:  •  Lipid panel; Future

## 2025-04-03 NOTE — PATIENT INSTRUCTIONS
[facility]    [address] [address]    Phone:  [phone]    Fax:  [phone]                                       Micah Lay   MRN: [id number]    Department:  [facility]   Date of Visit:  10/21/2017           Patient Information     Date Of Birth          1965        Your diagnoses for this visit were:     Acute bronchitis, unspecified organism        You were seen by Shari Condon APRN CNP.      Follow-up Information     Follow up with Cassius Barriga MD.    Specialty:  Family Practice    Why:  As needed    Contact information:    [address] [address]  [phone]          Discharge Instructions         Bronchitis with Wheezing (Viral or Bacterial: Adult)    Bronchitis is an infection of the air passages. It often occurs during a cold and is usually caused by a virus. Symptoms include cough with mucus (phlegm) and low-grade fever. This illness is contagious during the first few days and is spread through the air by coughing and sneezing, or by direct contact (touching the sick person and then touching your own eyes, nose, or mouth).  If there is a lot of inflammation, air flow is restricted. The air passages may also go into spasm, especially if you have asthma. This causes wheezing and difficulty breathing even in people who do not have asthma.  Bronchitis usually lasts 7 to 14 days. The wheezing should improve with treatment during the first week. An inhaler is often prescribed to relax the air passages and stop wheezing. Antibiotics will be prescribed if your doctor thinks there is also a secondary bacterial infection.  Home care    If symptoms are severe, rest at home for the first 2 to 3 days. When you go back to your usual activities, don't let yourself get too tired.    Do not smoke. Also avoid being exposed to secondhand smoke.    You may use over-the-counter medicine to control fever or  Recheck in 6 months.     Follow up with psychiatry regarding ADD symptoms.     Work on exercise.     Complete colonoscopy.     Please call the office if you are experiencing any worsening of symptoms or no symptom improvement.      pain, unless another medicine was prescribed. Note: If you have chronic liver or kidney disease or have ever had a stomach ulcer or gastrointestinal bleeding, talk with your healthcare provider before using these medicines. Also talk to your provider if you are taking medicine to prevent blood clots.) Aspirin should never be given to anyone younger than 18 years of age who is ill with a viral infection or fever. It may cause severe liver or brain damage.    Your appetite may be poor, so a light diet is fine. Avoid dehydration by drinking 6 to 8 glasses of fluids per day (such as water, soft drinks, sports drinks, juices, tea, or soup). Extra fluids will help loosen secretions in the nose and lungs.    Over-the-counter cough, cold, and sore-throat medicines will not shorten the length of the illness, but they may be helpful to reduce symptoms. (Note: Do not use decongestants if you have high blood pressure.)    If you were given an inhaler, use it exactly as directed. If you need to use it more often than prescribed, your condition may be worsening. If this happens, contact your healthcare provider.    If prescribed, finish all antibiotic medicine, even if you are feeling better after only a few days.  Follow-up care  Follow up with your healthcare provider, or as advised. If you had an X-ray or ECG (electrocardiogram), a specialist will review it. You will be notified of any new findings that may affect your care.  Note: If you are age 65 or older, or if you have a chronic lung disease or condition that affects your immune system, or you smoke, talk to your healthcare provider about having a pneumococcal vaccinations and a yearly influenza vaccination (flu shot).  When to seek medical advice  Call your healthcare provider right away if any of these occur:    Fever of 100.4 F (38 C) or higher    Coughing up increasing amounts of colored sputum    Weakness, drowsiness, headache, facial pain, ear pain, or a stiff  neck  Call 911, or get immediate medical care  Contact emergency services right away if any of these occur.    Coughing up blood    Worsening weakness, drowsiness, headache, or stiff neck    Increased wheezing not helped with medication, shortness of breath, or pain with breathing  Date Last Reviewed: 9/13/2015 2000-2017 [employer]. [address] [place] [address]. All rights reserved. This information is not intended as a substitute for professional medical care. Always follow your healthcare professional's instructions.          24 Hour Appointment Hotline       To make an appointment at any [facility], call [phone] ([phone]). If you don't have a family doctor or clinic, we will help you find one. [facility] clinics are conveniently located to serve the needs of you and your family.             Review of your medicines      START taking        Dose / Directions Last dose taken    azithromycin 250 MG tablet   Commonly known as:  ZITHROMAX Z-KALEE   Quantity:  6 tablet        Two tablets on the first day, then one tablet daily for the next 4 days   Refills:  0        predniSONE 20 MG tablet   Commonly known as:  DELTASONE   Quantity:  10 tablet        Take two tablets (= 40mg) each day for 5 (five) days   Refills:  0          Our records show that you are taking the medicines listed below. If these are incorrect, please call your family doctor or clinic.        Dose / Directions Last dose taken    * levothyroxine 150 MCG tablet   Commonly known as:  SYNTHROID/LEVOTHROID   Quantity:  30 tablet        TAKE 2 TABLETS ONCE DAILY FOR TOTAL DAILY DOSE OF 300MCG.   Refills:  0        * levothyroxine 150 MCG tablet   Commonly known as:  SYNTHROID/LEVOTHROID   Dose:  300 mcg   Quantity:  180 tablet        Take 2 tablets (300 mcg) by mouth daily   Refills:  3        MELATONIN TR 10 MG Tbcr   Dose:  1 tablet   Generic drug:  Melatonin        Take 1 tablet by mouth At Bedtime    Refills:  0        oxybutynin 10 MG 24 hr tablet   Commonly known as:  DITROPAN XL   Dose:  10 mg   Quantity:  30 tablet        Take 1 tablet (10 mg) by mouth daily   Refills:  11        pravastatin 40 MG tablet   Commonly known as:  PRAVACHOL   Dose:  40 mg   Quantity:  90 tablet        Take 1 tablet (40 mg) by mouth daily   Refills:  3        * Notice:  This list has 2 medication(s) that are the same as other medications prescribed for you. Read the directions carefully, and ask your doctor or other care provider to review them with you.            Prescriptions were sent or printed at these locations (2 Prescriptions)                   [facility] [place], [place] - [address]   [address] [address]    Telephone:  [phone]   Fax:  [phone]   Hours:                  E-Prescribed (2 of 2)         predniSONE (DELTASONE) 20 MG tablet               azithromycin (ZITHROMAX Z-KALEE) 250 MG tablet                Orders Needing Specimen Collection     None      Pending Results     No orders found from 10/19/2017 to 10/22/2017.            Pending Culture Results     No orders found from 10/19/2017 to 10/22/2017.            Pending Results Instructions     If you had any lab results that were not finalized at the time of your Discharge, you can call the ED Lab Result RN at [phone]. You will be contacted by this team for any positive Lab results or changes in treatment. The nurses are available 7 days a week from 10A to 6:30P.  You can leave a message 24 hours per day and they will return your call.        Test Results From Your Hospital Stay               Thank you for choosing [facility]       Thank you for choosing [facility] for your care. Our goal is always to provide you with excellent care. Hearing back from our patients is one way we can continue to improve our services. Please take a few minutes to complete the written survey that you may receive in the mail after you  visit with us. Thank you!        [employer] Information     [employer] gives you secure access to your electronic health record. If you see a primary care provider, you can also send messages to your care team and make appointments. If you have questions, please call your primary care clinic.  If you do not have a primary care provider, please call [phone] and they will assist you.        Care EveryWhere ID     This is your Care EveryWhere ID. This could be used by other organizations to access your [facility] medical records  [id number]        Equal Access to Services     VANESSA ORTIZ : Francesco Schaeffer, mandy ghotra, freddy valdezalnusrat foster, hiram dunn . So [facility] [phone].    ATENCIÓN: Si habla español, tiene a joaquin disposición servicios gratuitos de asistencia lingüística. Llame al [phone].    We comply with applicable federal civil rights laws and [place] laws. We do not discriminate on the basis of race, color, national origin, age, disability, sex, sexual orientation, or gender identity.            After Visit Summary       This is your record. Keep this with you and show to your community pharmacist(s) and doctor(s) at your next visit.

## 2025-04-03 NOTE — TELEPHONE ENCOUNTER
Routine referral received 4/3/25 from PCP for Med Management services. Pt's insurance is OON, [facility] message sent to inform. Outside resources sent.     Were outside resources sent: Yes [x] No [] - sent 4/3/25 via [facility]      Referral completed/closed

## 2025-04-03 NOTE — ASSESSMENT & PLAN NOTE
Echo done 2021:    LEFT VENTRICLE:  Systolic function was normal by visual assessment. Ejection fraction was estimated to be 60 %.  There were no regional wall motion abnormalities.  Wall thickness was moderately increased.  Doppler parameters were consistent with abnormal left ventricular relaxation (grade 1 diastolic dysfunction).     MITRAL VALVE:  There was trace regurgitation.     AORTIC VALVE:  Transaortic velocity was increased due to increased transvalvular flow.     TRICUSPID VALVE:  There was mild regurgitation.     PULMONIC VALVE:  There was trace regurgitation.    Repeat echo ordered- not yet completed by patient

## 2025-05-08 DIAGNOSIS — I10 ESSENTIAL HYPERTENSION: ICD-10-CM

## 2025-05-08 RX ORDER — AMLODIPINE BESYLATE 5 MG/1
5 TABLET ORAL DAILY
Qty: 90 TABLET | Refills: 1 | Status: SHIPPED | OUTPATIENT
Start: 2025-05-08

## 2025-06-04 DIAGNOSIS — F32.A DEPRESSION, UNSPECIFIED DEPRESSION TYPE: ICD-10-CM

## 2025-06-04 RX ORDER — VENLAFAXINE HYDROCHLORIDE 150 MG/1
150 CAPSULE, EXTENDED RELEASE ORAL DAILY
Qty: 90 CAPSULE | Refills: 1 | Status: SHIPPED | OUTPATIENT
Start: 2025-06-04

## 2025-07-20 DIAGNOSIS — I10 ESSENTIAL HYPERTENSION: ICD-10-CM

## 2025-07-21 RX ORDER — LOSARTAN POTASSIUM 100 MG/1
100 TABLET ORAL DAILY
Qty: 90 TABLET | Refills: 1 | Status: SHIPPED | OUTPATIENT
Start: 2025-07-21